# Patient Record
Sex: MALE | Race: WHITE | NOT HISPANIC OR LATINO | Employment: OTHER | URBAN - METROPOLITAN AREA
[De-identification: names, ages, dates, MRNs, and addresses within clinical notes are randomized per-mention and may not be internally consistent; named-entity substitution may affect disease eponyms.]

---

## 2017-11-10 ENCOUNTER — TRANSFERRED RECORDS (OUTPATIENT)
Dept: HEALTH INFORMATION MANAGEMENT | Facility: CLINIC | Age: 67
End: 2017-11-10

## 2018-02-09 ENCOUNTER — TELEPHONE (OUTPATIENT)
Dept: NEUROLOGY | Facility: CLINIC | Age: 68
End: 2018-02-09

## 2018-02-09 DIAGNOSIS — G24.3 CERVICAL DYSTONIA: Primary | ICD-10-CM

## 2018-02-09 NOTE — TELEPHONE ENCOUNTER
Called patient to discuss records needed. He will call [facility] and have the appropriate records sent.

## 2018-02-09 NOTE — TELEPHONE ENCOUNTER
----- Message from Jagdish Valentine MD sent at 2/8/2018 11:29 AM CST -----  Regarding: RE: Serge for Botox Injections  Contact: [phone]  His name is familiar but I can't remember what I inject him with or how much.    Could you call him and have him fax us his last [place] botox record.    Thanks,    Cb      ----- Message -----     From: Betsy Jackson RN     Sent: 2/5/2018  12:24 PM       To: Jagdish Valentine MD, Sadia Lynch, #  Subject: FW: Serge for Botox Injections               Order for botox will need to be written and note sent to Sadia Lynch.    Thank you,  Betsy  ----- Message -----     From: Jennifer Mcdowell     Sent: 2/5/2018  10:06 AM       To: Markel Dos Santos RN, Betsy Jackson RN  Subject: Serge for Botox Injections                   Call from PT as he was seeing Dr. Valentine at [facility] and is looking to schedule his next appt here.  Please call PT at [phone]    Thank You,  Jennifer    Please DO NOT send this message and/or reply back to sender.  Call Center Representatives DO NOT respond to messages.

## 2018-04-16 ASSESSMENT — ENCOUNTER SYMPTOMS
ARTHRALGIAS: 0
SHORTNESS OF BREATH: 1
DECREASED CONCENTRATION: 0
POSTURAL DYSPNEA: 0
COUGH: 1
BACK PAIN: 0
FATIGUE: 0
FLANK PAIN: 0
FEVER: 1
DYSPNEA ON EXERTION: 1
DIFFICULTY URINATING: 0
ALTERED TEMPERATURE REGULATION: 0
MUSCLE WEAKNESS: 0
NECK PAIN: 1
HEMOPTYSIS: 0
POLYPHAGIA: 0
INSOMNIA: 1
WEIGHT LOSS: 0
INCREASED ENERGY: 1
CHILLS: 1
MYALGIAS: 1
DYSURIA: 0
PANIC: 0
WEIGHT GAIN: 0
WHEEZING: 1
DECREASED APPETITE: 0
HEMATURIA: 0
HALLUCINATIONS: 0
MUSCLE CRAMPS: 0
DEPRESSION: 0
POLYDIPSIA: 0
SNORES LOUDLY: 0
SPUTUM PRODUCTION: 1
COUGH DISTURBING SLEEP: 1
NIGHT SWEATS: 1
NERVOUS/ANXIOUS: 0
JOINT SWELLING: 0
STIFFNESS: 0

## 2018-04-17 ENCOUNTER — OFFICE VISIT (OUTPATIENT)
Dept: NEUROLOGY | Facility: CLINIC | Age: 68
End: 2018-04-17
Payer: COMMERCIAL

## 2018-04-17 VITALS
HEART RATE: 84 BPM | SYSTOLIC BLOOD PRESSURE: 147 MMHG | BODY MASS INDEX: 24.27 KG/M2 | DIASTOLIC BLOOD PRESSURE: 90 MMHG | HEIGHT: 71 IN | WEIGHT: 173.4 LBS

## 2018-04-17 DIAGNOSIS — G24.3 CERVICAL DYSTONIA: Primary | ICD-10-CM

## 2018-04-17 RX ORDER — SIMVASTATIN 40 MG
40 TABLET ORAL AT BEDTIME
COMMUNITY

## 2018-04-17 RX ORDER — AMOXICILLIN 500 MG
CAPSULE ORAL
COMMUNITY

## 2018-04-17 RX ORDER — MULTIVITAMIN WITH IRON
TABLET ORAL
Status: ON HOLD | COMMUNITY
Start: 2018-01-01 | End: 2018-11-08

## 2018-04-17 RX ORDER — ALBUTEROL SULFATE 90 UG/1
AEROSOL, METERED RESPIRATORY (INHALATION) PRN
COMMUNITY

## 2018-04-17 ASSESSMENT — PAIN SCALES - GENERAL: PAINLEVEL: NO PAIN (0)

## 2018-04-17 NOTE — MR AVS SNAPSHOT
After Visit Summary   4/17/2018    Markel Baron    MRN: [id number]           Patient Information     Date Of Birth          1950        Visit Information        Provider Department      4/17/2018 9:00 AM Jagdish Valentine MD [facility] Neurology        Today's Diagnoses     Cervical dystonia    -  1      Care Instructions    #Call and make an appointment when you need your next injection.          Follow-ups after your visit        Follow-up notes from your care team     Return if symptoms worsen or fail to improve.      Future tests that were ordered for you today     Open Future Orders        Priority Expected Expires Ordered    Needle EMG Guide w/Chemodenervation (82490) Routine  4/17/2019 4/17/2018    HC CHEMODENERVATION MUSCLE NECK UNILAT Routine  4/17/2019 4/17/2018            Who to contact     Please call your clinic at [phone] to:    Ask questions about your health    Make or cancel appointments    Discuss your medicines    Learn about your test results    Speak to your doctor            Additional Information About Your Visit        [employer] Information     [employer] gives you secure access to your electronic health record. If you see a primary care provider, you can also send messages to your care team and make appointments. If you have questions, please call your primary care clinic.  If you do not have a primary care provider, please call [phone] and they will assist you.      [employer] is an electronic gateway that provides easy, online access to your medical records. With [employer], you can request a clinic appointment, read your test results, renew a prescription or communicate with your care team.     To access your existing account, please contact your [facility] or call [phone] for assistance.        Care EveryWhere ID     This is your Care EveryWhere ID. This could be used by other organizations to access your [facility]  "records  [id number]        Your Vitals Were     Pulse Height BMI (Body Mass Index)             84 1.803 m (5' 11\") 24.18 kg/m2          Blood Pressure from Last 3 Encounters:   04/17/18 147/90    Weight from Last 3 Encounters:   04/17/18 78.7 kg (173 lb 6.4 oz)               Primary Care Provider    None Specified       No primary provider on file.        Equal Access to Services     [facility] : Hadii aad ku hadasho Soomaali, waaxda luqadaha, qaybta kaalmada adeegyada, waxabe idiin jeanethn adetawanda ayala la'aan . So [facility] [phone].    ATENCIÓN: Si habla naomy, tiene a alegre disposición servicios gratuitos de asistencia lingüística. Llame al [phone].    We comply with applicable federal civil rights laws and [place] laws. We do not discriminate on the basis of race, color, national origin, age, disability, sex, sexual orientation, or gender identity.            Thank you!     Thank you for choosing [facility] NEUROLOGY  for your care. Our goal is always to provide you with excellent care. Hearing back from our patients is one way we can continue to improve our services. Please take a few minutes to complete the written survey that you may receive in the mail after your visit with us. Thank you!             Your Updated Medication List - Protect others around you: Learn how to safely use, store and throw away your medicines at www.disposemymeds.org.          This list is accurate as of 4/17/18  9:31 AM.  Always use your most recent med list.                   Brand Name Dispense Instructions for use Diagnosis    LASHAY ASPIRIN EC LOW DOSE 81 MG EC tablet   Generic drug:  aspirin           BOTOX 100 units injection   Generic drug:  botulinum toxin type A     2 each    Inject 800 units over 1 year.  Inject 200 units intramuscularly every 3 months    Cervical dystonia       DULERA 100-5 MCG/ACT oral inhaler   Generic drug:  mometasone-formoterol           fish Oil 1200 MG capsule           magnesium 250 MG " tablet           PROAIR  (90 Base) MCG/ACT Inhaler   Generic drug:  albuterol           simvastatin 40 MG tablet    ZOCOR          SUPER B-COMPLEX Tabs           vitamin D3 1000 units Caps

## 2018-04-17 NOTE — PROGRESS NOTES
Movement Disorders Botulinum Toxin Procedure Note    Chief Complaint: Cervical dystonia    History of Present Illness:  Markel Baron is a 67 year old male who presents to clinic for botulinum toxin injections for treatment of cervical dystonia    The patient reported an excellent response response to the last injection of November 2017 at the [facility].    Comments: Mr. Baron is coming to establish care here at the [facility].  I previously gave him botulinum toxin injections at the [facility]    Complications: None    Botulinum toxin effect: None    Prior to the start of the procedure and with procedural staff participation, I verbally confirmed the patient s identity using two indicators, relevant allergies, that the procedure was appropriate and matched the consent or emergent situation, and that the correct equipment/implants were available. Immediately prior to starting the procedure I conducted the Time Out with the procedural staff and re-confirmed the patient s name, procedure, and site/side. (The Joint Commission universal protocol was followed.)  Yes    TOTAL DOSE ADMINISTERED:  Dose Administered:  185 units Botox    Diluent Used:  Preservative Free Normal Saline  Total Volume of Diluent Used:  2 ml  Lot # [id number] with Expiration Date: October 2020      Medication guide was offered to patient and was accepted.    CONSENT:  The risks, benefits, and treatment options were discussed with Markel Baron and she agreed to proceed.      Written consent was obtained byYes    EQUIPMENT USED:  Needle-37mm stimulating/recording    SKIN PREPARATION:  Skin preparation was performed using an alcohol wipe.    GUIDANCE DESCRIPTION:  EMG guidance:Yes  Ultrasound guidance:No    AREA/MUSCLE INJECTED:          Muscles Injected Units Injected Number of Injections   Right sternocleidomastoid 45 1   Left splenius capitis  50 1   Left cervical paraspinals 45 1   Left levator scapulae 20 20 1   Left scalenes 25  1                       Total Units Injected: 185    Unavoidable Waste: 15    Total Units Billed 200      The patient tolerated the injections without difficulty.      Summary:    The patient was injected today with  185 units of onabotulinumtoxinA (Botox) under EMG guidance as treatment of cervical dystonia.  The patient tolerated the procedure well.  Plan  Follow-up in 4-5 months' time to consider repeat injections

## 2018-04-17 NOTE — LETTER
4/17/2018       RE: Markel Baron  [address] [address]     Dear Colleague,    Thank you for referring your patient, Markel Baron, to the [facility] NEUROLOGY at [facility]. Please see a copy of my visit note below.    Movement Disorders Botulinum Toxin Procedure Note    Chief Complaint: Cervical dystonia    History of Present Illness:  Markel Baron is a 67 year old male who presents to clinic for botulinum toxin injections for treatment of cervical dystonia    The patient reported an excellent response response to the last injection of November 2017 at the [facility].    Comments: Mr. Baron is coming to establish care here at the [facility].  I previously gave him botulinum toxin injections at the [facility]    Complications: None    Botulinum toxin effect: None    Prior to the start of the procedure and with procedural staff participation, I verbally confirmed the patient s identity using two indicators, relevant allergies, that the procedure was appropriate and matched the consent or emergent situation, and that the correct equipment/implants were available. Immediately prior to starting the procedure I conducted the Time Out with the procedural staff and re-confirmed the patient s name, procedure, and site/side. (The Joint Commission universal protocol was followed.)  Yes    TOTAL DOSE ADMINISTERED:  Dose Administered:  185 units Botox    Diluent Used:  Preservative Free Normal Saline  Total Volume of Diluent Used:  2 ml  Lot # [id number] with Expiration Date: October 2020      Medication guide was offered to patient and was accepted.    CONSENT:  The risks, benefits, and treatment options were discussed with Markel Baron and she agreed to proceed.      Written consent was obtained byYes    EQUIPMENT USED:  Needle-37mm stimulating/recording    SKIN PREPARATION:  Skin preparation was performed using an alcohol wipe.    GUIDANCE  DESCRIPTION:  EMG guidance:Yes  Ultrasound guidance:No    AREA/MUSCLE INJECTED:          Muscles Injected Units Injected Number of Injections   Right sternocleidomastoid 45 1   Left splenius capitis  50 1   Left cervical paraspinals 45 1   Left levator scapulae 20 20 1   Left scalenes 25 1                       Total Units Injected: 185    Unavoidable Waste: 15    Total Units Billed 200      The patient tolerated the injections without difficulty.      Summary:    The patient was injected today with  185 units of onabotulinumtoxinA (Botox) under EMG guidance as treatment of cervical dystonia.  The patient tolerated the procedure well.  Plan  Follow-up in 4-5 months' time to consider repeat injections      Again, thank you for allowing me to participate in the care of your patient.      Sincerely,    Jagdish Valentine MD

## 2018-04-18 ENCOUNTER — TRANSFERRED RECORDS (OUTPATIENT)
Dept: HEALTH INFORMATION MANAGEMENT | Facility: CLINIC | Age: 68
End: 2018-04-18

## 2018-04-26 ENCOUNTER — TRANSFERRED RECORDS (OUTPATIENT)
Dept: HEALTH INFORMATION MANAGEMENT | Facility: CLINIC | Age: 68
End: 2018-04-26

## 2018-05-08 ENCOUNTER — MYC MEDICAL ADVICE (OUTPATIENT)
Dept: NEUROLOGY | Facility: CLINIC | Age: 68
End: 2018-05-08

## 2018-05-11 NOTE — TELEPHONE ENCOUNTER
Hi Mr. Baron,    I looked at your MRI report.   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  This is compressing her spinal cord.  If this is causing symptoms she would have trouble with your gait or balance weakness in your legs or trouble controlling bladder control.  You might also have numbness in your legs.    I be happy to see her back about this and refer you to a neurosurgeon.  Perhaps you have already been referred to a neurosurgeon.  Certainly that is the next step.    Sincerely Jagdish Valentine MD

## 2018-07-27 DIAGNOSIS — D43.2 NEOPLASM OF UNCERTAIN BEHAVIOR OF BRAIN AND SPINAL CORD (H): Primary | ICD-10-CM

## 2018-07-27 DIAGNOSIS — D43.4 NEOPLASM OF UNCERTAIN BEHAVIOR OF BRAIN AND SPINAL CORD (H): Primary | ICD-10-CM

## 2018-08-30 ASSESSMENT — ENCOUNTER SYMPTOMS
STIFFNESS: 0
HEMATURIA: 0
MEMORY LOSS: 0
PALPITATIONS: 0
MUSCLE WEAKNESS: 0
PANIC: 0
INSOMNIA: 1
BACK PAIN: 1
DECREASED CONCENTRATION: 0
DYSURIA: 0
SPEECH CHANGE: 0
LOSS OF CONSCIOUSNESS: 0
LEG PAIN: 0
TREMORS: 0
COUGH DISTURBING SLEEP: 0
HYPERTENSION: 0
PARALYSIS: 0
DYSPNEA ON EXERTION: 0
SYNCOPE: 0
WHEEZING: 0
SHORTNESS OF BREATH: 1
HYPOTENSION: 0
NUMBNESS: 0
MUSCLE CRAMPS: 0
SPUTUM PRODUCTION: 0
HEMOPTYSIS: 0
SNORES LOUDLY: 0
POSTURAL DYSPNEA: 0
ORTHOPNEA: 0
DIFFICULTY URINATING: 1
WEAKNESS: 0
EXERCISE INTOLERANCE: 0
ARTHRALGIAS: 0
LIGHT-HEADEDNESS: 0
DEPRESSION: 0
SLEEP DISTURBANCES DUE TO BREATHING: 0
DISTURBANCES IN COORDINATION: 0
SEIZURES: 0
JOINT SWELLING: 0
NERVOUS/ANXIOUS: 1
TINGLING: 1
HEADACHES: 0
COUGH: 0
FLANK PAIN: 0
MYALGIAS: 1
NECK PAIN: 1
DIZZINESS: 1

## 2018-09-05 ENCOUNTER — PATIENT OUTREACH (OUTPATIENT)
Dept: CARE COORDINATION | Facility: CLINIC | Age: 68
End: 2018-09-05

## 2018-09-06 ENCOUNTER — RADIANT APPOINTMENT (OUTPATIENT)
Dept: GENERAL RADIOLOGY | Facility: CLINIC | Age: 68
End: 2018-09-06
Attending: NEUROLOGICAL SURGERY
Payer: COMMERCIAL

## 2018-09-06 ENCOUNTER — OFFICE VISIT (OUTPATIENT)
Dept: NEUROSURGERY | Facility: CLINIC | Age: 68
End: 2018-09-06
Payer: COMMERCIAL

## 2018-09-06 VITALS
OXYGEN SATURATION: 97 % | HEART RATE: 65 BPM | BODY MASS INDEX: 23.43 KG/M2 | DIASTOLIC BLOOD PRESSURE: 87 MMHG | SYSTOLIC BLOOD PRESSURE: 135 MMHG | RESPIRATION RATE: 18 BRPM | TEMPERATURE: 98.2 F | HEIGHT: 72 IN | WEIGHT: 173 LBS

## 2018-09-06 DIAGNOSIS — D43.2 NEOPLASM OF UNCERTAIN BEHAVIOR OF BRAIN AND SPINAL CORD (H): Primary | ICD-10-CM

## 2018-09-06 DIAGNOSIS — D43.4 NEOPLASM OF UNCERTAIN BEHAVIOR OF BRAIN AND SPINAL CORD (H): ICD-10-CM

## 2018-09-06 DIAGNOSIS — D43.2 NEOPLASM OF UNCERTAIN BEHAVIOR OF BRAIN AND SPINAL CORD (H): ICD-10-CM

## 2018-09-06 DIAGNOSIS — D43.4 NEOPLASM OF UNCERTAIN BEHAVIOR OF BRAIN AND SPINAL CORD (H): Primary | ICD-10-CM

## 2018-09-06 DIAGNOSIS — G24.3 CERVICAL DYSTONIA: ICD-10-CM

## 2018-09-06 PROBLEM — D17.79 LIPOMA OF SPINAL CORD: Status: ACTIVE | Noted: 2018-04-26

## 2018-09-06 ASSESSMENT — PAIN SCALES - GENERAL: PAINLEVEL: MILD PAIN (2)

## 2018-09-06 NOTE — LETTER
"9/6/2018       RE: Markel Baron  [address] [address]     Dear Colleague,    Thank you for referring your patient, Markel Baron, to the [facility] NEUROSURGERY at [facility]. Please see a copy of my visit note below.        Neurosurgery Clinic Note    Chief Complaint: \"I have a tumor in my spinal cord\"    History of Present Illness:  It was a pleasure to evaluate Markel Baron in clinic today at the kind referral of Jagdish Valentine MD  [address] [id number].    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    He was diagnosed with cervical dystonia two years ago. He sees [facility] for Botox injections. He has a left head-turn, never right. Sensory distraction with hand on chin/face seems to help decrease the dystonia.    Patient has no pain related to thoracic spine lesion, no radiating or radicular pain in arms/hands/chest. Has numbness in left great toe. Is not sure whether has bladder urgency changes, he will urinate 2-3 times per night and every several hours during the day. He feels like his balance may be changing. He denies any weakness.    He had one episode of having loose stool incontinence that he did not realize, two months ago. This was an isolated incident.          Review of Systems   Answers for HPI/ROS submitted by the patient on 8/30/2018   General Symptoms: No  Skin Symptoms: No  HENT Symptoms: No  EYE SYMPTOMS: No  HEART SYMPTOMS: Yes  LUNG SYMPTOMS: Yes  INTESTINAL SYMPTOMS: No  URINARY SYMPTOMS: Yes  REPRODUCTIVE SYMPTOMS: No  SKELETAL SYMPTOMS: Yes  BLOOD SYMPTOMS: No  NERVOUS SYSTEM SYMPTOMS: Yes  MENTAL HEALTH SYMPTOMS: Yes  Cough: No  Sputum or phlegm: No  Coughing up blood: No  Difficulty breating or shortness of breath: " Yes  Snoring: No  Wheezing: No  Difficulty breathing on exertion: No  Nighttime Cough: No  Difficulty breathing when lying flat: No  Chest pain or pressure: No  Fast or irregular heartbeat: No  Pain in legs with walking: No  Trouble breathing while lying down: No  Fingers or toes appear blue: No  High blood pressure: No  Low blood pressure: No  Fainting: No  Murmurs: No  Pacemaker: No  Varicose veins: No  Edema or swelling: Yes  Wake up at night with shortness of breath: No  Light-headedness: No  Exercise intolerance: No  Trouble holding urine or incontinence: No  Pain or burning: No  Trouble starting or stopping: No  Increased frequency of urination: Yes  Blood in urine: No  Decreased frequency of urination: No  Frequent nighttime urination: Yes  Flank pain: No  Difficulty emptying bladder: Yes  Back pain: Yes  Muscle aches: Yes  Neck pain: Yes  Swollen joints: No  Joint pain: No  Bone pain: No  Muscle cramps: No  Muscle weakness: No  Joint stiffness: No  Bone fracture: No  Trouble with coordination: No  Dizziness or trouble with balance: Yes  Fainting or black-out spells: No  Memory loss: No  Headache: No  Seizures: No  Speech problems: No  Tingling: Yes  Tremor: No  Weakness: No  Difficulty walking: No  Paralysis: No  Numbness: No  Nervous or Anxious: Yes  Depression: No  Trouble sleeping: Yes  Trouble thinking or concentrating: No  Mood changes: No  Panic attacks: No  PHQ-2 Score: 2    Past Medical History- cervical dystonia    Past Surgical History:   Procedure Laterality Date     COLONOSCOPY  5 years ago     HERNIA REPAIR  17 years ago     ORTHOPEDIC SURGERY  shoulder 37 years ago, knee cartilage 30 years ago       Social History     Social History     Marital status:      Spouse name: N/A     Number of children: N/A     Years of education: N/A     Social History Main Topics     Smoking status: Former Smoker     Packs/day: 1.50     Years: 10.00     Types: Cigarettes, Dip, chew, snus or snuff     Quit  "date: 1985     Smokeless tobacco: Former User     Quit date: 1985     Alcohol use No     Drug use: No     Sexual activity: No     Other Topics Concern     Parent/Sibling W/ Cabg, Mi Or Angioplasty Before 65f 55m? Yes     mother was 65 when she  of heart attack     Social History Narrative     Family History- no known dystonia      IMAGING per my own measurement and interpretation:  Xrays:standing long cassette 18    No scoliosis, normal sagittal alignment      MRI thoracic spine with contrast  at outside hospital:  T1 and T2 hyperintense intradural mass lesion upper thoracic spine, non-enhancing      Resulted Imaging/Labs:  Bone Density:  No results found.    Vitamin D:  Vitamin D Deficiency Screening Results:  No results found for: VITDT  No results found for: FWR834, UGOV519, RGBJ23NVZYV, VITD3, D2VIT, D3VIT, DTOT, [id number], [id number], [id number], [id number], [id number], [id number]      Nutritional Status:  Estimated body mass index is 23.79 kg/(m^2) as calculated from the following:    Height as of this encounter: 1.816 m (5' 11.5\").    Weight as of this encounter: 78.5 kg (173 lb).    No results found for: ALBUMIN    Diabetes Screening:  No results found for: A1C    Nicotine Usage:    No but former                Physical Exam   /87  Pulse 65  Temp 98.2  F (36.8  C) (Oral)  Resp 18  Ht 1.816 m (5' 11.5\")  Wt 78.5 kg (173 lb)  SpO2 97%  BMI 23.79 kg/m2  Constitutional: Oriented to person, place, and time. Appears well-developed and well-nourished. Cooperative. No distress.   HENT:   Head: Normocephalic and atraumatic.   Eyes: Conjunctivae are normal.  Neck: No spinous process tenderness and no muscular tenderness present. No tracheal deviation present.  Cardiovascular: Normal rate and regular rhythm.    Pulmonary/Chest: Effort normal and breath sounds normal.  Abdominal: Soft. Bowel sounds are normal. Exhibits no distension. There is no tenderness.   Musculoskeletal: "   Cervical flexion-extension range of motion: leftward chin tilt and left torticollis  Lumbar flexion/extension range of motion: normal    Neurological: alert and oriented to person, place, and time.   No cranial nerve deficit   sensory deficit left great toe  Gait normal  Babinski downgoing toes    Reflex Scores:        Tricep reflexes are 2+ on the right side and 2+ on the left side.       Bicep reflexes are 2+ on the right side and 2+ on the left side.       Brachioradialis reflexes are 2+ on the right side and 2+ on the left side.       Patellar reflexes are 2+ on the right side and 2+ on the left side.       Achilles reflexes are 2+ on the right side and 2+ on the left side.    STRENGTH LEFT RIGHT   Deltoid 5 5   Bicep 5 5   Wrist Extensor 5 5   Tricep 5 5   Finger flexion 5 5   Finger abduction 5 5    5 5       Hip Flexion     5     5   Knee Extension 5 5   Ankle Dorsiflexion 5 5   Extensor Hallucis Longus 5 5   Plantar Flexion 5 5   Foot eversion 5 5   Foot inversion 5 5     No Lhermitte's, No Spurling's  No Danica's   No ankle clonus  Able to tandem walk with mild difficulty    Skin: Skin is warm, dry and intact.   Psychiatric: Normal mood and affect. Speech is normal and behavior is normal.    ASSESSMENT:  Markel Baron is a 67 year old male with intradural thoracic mass lesion, and cervical dystonia    PLAN:  We will obtain an MRI brain/cervical/thoracic/lumbar spine to evaluate any additional mass lesions.  We will get the imaging from CT scan of chest to evaluate for the potential calcification that radiologist mentions seeing in thoracic area.    We will obtain bladder ultrasound to evaluate for residual post-void urine volume to see if there is significant bladder emptying/spasticity.    Patient to return with above completed.    Isadora Boswell MD    [facility]  Complex Spinal Deformity, Scoliosis, and Minimally Invasive Spine Surgery  Specialist  Office: [phone]    9/6/2018    I spent 60  minutes in patient care with greater than 50% spent in counseling and/or coordination of care.

## 2018-09-06 NOTE — PATIENT INSTRUCTIONS
Have a CT done of chest, abdomen and pelivs  MRIs done of the cervical, thoracic and lumbar spine.  An ultra sound to evaluate bladder emptying.    These tests can be done in the morning and see Dr. Boswell in the afternoon.

## 2018-09-06 NOTE — MR AVS SNAPSHOT
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  An ultra sound to evaluate bladder emptying.    These tests can be done in the morning and see Dr. Boswell in the afternoon.          Follow-ups after your visit        Follow-up notes from your care team     Return if symptoms worsen or fail to improve.      Your next 10 appointments already scheduled     Sep 26, 2018  3:00 PM CDT   US BLADDER ONLY with UCUS3   [facility] US ([facility])    [address] [place] [id number] [phone]           Please bring a list of your medicines (including vitamins, minerals and over-the-counter drugs). Also, tell your doctor about any allergies you may have. Wear comfortable clothes and leave your valuables at home.  Adults: Drink two 8-ounce glasses of fluid 45 minutes before your exam. Do not empty your bladder until after this test.  Children: Children who are potty trained up to 6 years old should drink at least 2 cups (16 oz) of water/non-carbonated beverage 30 minutes prior to the exam. Children who are 6-10 years should drink at least 3 cups (24 oz) of water/non-carbonated beverage 45 minutes prior to the exam. Children who are 10 years or older should drink at least 4 cups (32 oz) of water/non-carbonated beverage 45 minutes prior to the exam. If your child is very uncomfortable or has an urgent need to pee, please notify a technologist; they will try to find out how much longer the wait may  be and provide instructions to help relieve the pressure.  Please call the Imaging Department at your exam site with any questions.            Sep 26, 2018  4:00 PM CDT   MR BRAIN W/O & W CONTRAST with [address] [facility] MRI ([facility] [place])    [address] [id number] [phone]           Take your medicines as usual, unless your doctor tells you not to. Bring a list of your current medicines to your exam (including vitamins, minerals and over-the-counter drugs).  You may or may not receive intravenous (IV) contrast for this exam pending the discretion of the Radiologist.  You do not need to do anything special to prepare.  The MRI machine uses a strong magnet. Please wear clothes without metal (snaps, zippers). A sweatsuit works well, or we may give you a hospital gown.  Please remove any body piercings and hair extensions before you arrive. You will also remove watches, jewelry, hairpins, wallets, dentures, partial dental plates and hearing aids. You may wear contact lenses, and you may be able to wear your rings. We have a safe place to keep your personal items, but it is safer to leave them at home.  **IMPORTANT** THE INSTRUCTIONS BELOW ARE ONLY FOR THOSE PATIENTS WHO HAVE BEEN PRESCRIBED SEDATION OR GENERAL ANESTHESIA DURING THEIR MRI PROCEDURE:  IF YOUR DOCTOR PRESCRIBED ORAL SEDATION (take medicine to help you relax during your exam):   You must get the medicine from your doctor (oral medication) before you arrive. Bring the medicine to the exam. Do not take it at home. You ll be told when to take it upon arriving for your exam.   Arrive one hour early. Bring someone who can take you home after the test. Your medicine will make you sleepy. After the exam, you may not drive, take a bus or take a taxi by yourself.  IF YOUR DOCTOR PRESCRIBED IV SEDATION:   Arrive one hour early. Bring someone who can take you home after the test. Your  medicine will make you sleepy. After the exam, you may not drive, take a bus or take a taxi by yourself.   No eating 6 hours before your exam. You may have clear liquids up until 4 hours before your exam. (Clear liquids include water, clear tea, black coffee and fruit juice without pulp.)  IF YOUR DOCTOR PRESCRIBED ANESTHESIA (be asleep for your exam):   Arrive 1 1/2 hours early. Bring someone who can take you home after the test. You may not drive, take a bus or take a taxi by yourself.   No eating 8 hours before your exam. You may have clear liquids up until 4 hours before your exam. (Clear liquids include water, clear tea, black coffee and fruit juice without pulp.)   You will spend four to five hours in the recovery room.  Please call the Imaging Department at your exam site with any questions.            Sep 26, 2018  4:45 PM CDT   MR CERVICAL SPINE W/O & W CONTRAST with [address] MRI ([facility] [place])    [address] [address] [phone]           Take your medicines as usual, unless your doctor tells you not to. Bring a list of your current medicines to your exam (including vitamins, minerals and over-the-counter drugs).  You may or may not receive intravenous (IV) contrast for this exam pending the discretion of the Radiologist.  You do not need to do anything special to prepare.  The MRI machine uses a strong magnet. Please wear clothes without metal (snaps, zippers). A sweatsuit works well, or we may give you a hospital gown.  Please remove any body piercings and hair extensions before you arrive. You will also remove watches, jewelry, hairpins, wallets, dentures, partial dental plates and hearing aids. You may wear contact lenses, and you may be able to wear your rings. We have a safe place to keep your personal items, but it is safer to leave them at home.  **IMPORTANT** THE INSTRUCTIONS BELOW ARE ONLY FOR THOSE PATIENTS  WHO HAVE BEEN PRESCRIBED SEDATION OR GENERAL ANESTHESIA DURING THEIR MRI PROCEDURE:  IF YOUR DOCTOR PRESCRIBED ORAL SEDATION (take medicine to help you relax during your exam):   You must get the medicine from your doctor (oral medication) before you arrive. Bring the medicine to the exam. Do not take it at home. You ll be told when to take it upon arriving for your exam.   Arrive one hour early. Bring someone who can take you home after the test. Your medicine will make you sleepy. After the exam, you may not drive, take a bus or take a taxi by yourself.  IF YOUR DOCTOR PRESCRIBED IV SEDATION:   Arrive one hour early. Bring someone who can take you home after the test. Your medicine will make you sleepy. After the exam, you may not drive, take a bus or take a taxi by yourself.   No eating 6 hours before your exam. You may have clear liquids up until 4 hours before your exam. (Clear liquids include water, clear tea, black coffee and fruit juice without pulp.)  IF YOUR DOCTOR PRESCRIBED ANESTHESIA (be asleep for your exam):   Arrive 1 1/2 hours early. Bring someone who can take you home after the test. You may not drive, take a bus or take a taxi by yourself.   No eating 8 hours before your exam. You may have clear liquids up until 4 hours before your exam. (Clear liquids include water, clear tea, black coffee and fruit juice without pulp.)   You will spend four to five hours in the recovery room.  Please call the Imaging Department at your exam site with any questions.            Sep 26, 2018  5:30 PM CDT   MR THORACIC SPINE W/O & W CONTRAST with [address] [facility] ([facility])    [address] [address] [phone]           Take your medicines as usual, unless your doctor tells you not to. Bring a list of your current medicines to your exam (including vitamins, minerals and over-the-counter drugs).  You may or may not receive  intravenous (IV) contrast for this exam pending the discretion of the Radiologist.  You do not need to do anything special to prepare.  The MRI machine uses a strong magnet. Please wear clothes without metal (snaps, zippers). A sweatsuit works well, or we may give you a hospital gown.  Please remove any body piercings and hair extensions before you arrive. You will also remove watches, jewelry, hairpins, wallets, dentures, partial dental plates and hearing aids. You may wear contact lenses, and you may be able to wear your rings. We have a safe place to keep your personal items, but it is safer to leave them at home.  **IMPORTANT** THE INSTRUCTIONS BELOW ARE ONLY FOR THOSE PATIENTS WHO HAVE BEEN PRESCRIBED SEDATION OR GENERAL ANESTHESIA DURING THEIR MRI PROCEDURE:  IF YOUR DOCTOR PRESCRIBED ORAL SEDATION (take medicine to help you relax during your exam):   You must get the medicine from your doctor (oral medication) before you arrive. Bring the medicine to the exam. Do not take it at home. You ll be told when to take it upon arriving for your exam.   Arrive one hour early. Bring someone who can take you home after the test. Your medicine will make you sleepy. After the exam, you may not drive, take a bus or take a taxi by yourself.  IF YOUR DOCTOR PRESCRIBED IV SEDATION:   Arrive one hour early. Bring someone who can take you home after the test. Your medicine will make you sleepy. After the exam, you may not drive, take a bus or take a taxi by yourself.   No eating 6 hours before your exam. You may have clear liquids up until 4 hours before your exam. (Clear liquids include water, clear tea, black coffee and fruit juice without pulp.)  IF YOUR DOCTOR PRESCRIBED ANESTHESIA (be asleep for your exam):   Arrive 1 1/2 hours early. Bring someone who can take you home after the test. You may not drive, take a bus or take a taxi by yourself.   No eating 8 hours before your exam. You may have clear liquids up until 4 hours  before your exam. (Clear liquids include water, clear tea, black coffee and fruit juice without pulp.)   You will spend four to five hours in the recovery room.  Please call the Imaging Department at your exam site with any questions.            Sep 26, 2018  6:15 PM CDT   MR LUMBAR SPINE W/O & W CONTRAST with [address] MRI ([facility] [place])    [address] [address] [phone]           Take your medicines as usual, unless your doctor tells you not to. Bring a list of your current medicines to your exam (including vitamins, minerals and over-the-counter drugs).  You may or may not receive intravenous (IV) contrast for this exam pending the discretion of the Radiologist.  You do not need to do anything special to prepare.  The MRI machine uses a strong magnet. Please wear clothes without metal (snaps, zippers). A sweatsuit works well, or we may give you a hospital gown.  Please remove any body piercings and hair extensions before you arrive. You will also remove watches, jewelry, hairpins, wallets, dentures, partial dental plates and hearing aids. You may wear contact lenses, and you may be able to wear your rings. We have a safe place to keep your personal items, but it is safer to leave them at home.  **IMPORTANT** THE INSTRUCTIONS BELOW ARE ONLY FOR THOSE PATIENTS WHO HAVE BEEN PRESCRIBED SEDATION OR GENERAL ANESTHESIA DURING THEIR MRI PROCEDURE:  IF YOUR DOCTOR PRESCRIBED ORAL SEDATION (take medicine to help you relax during your exam):   You must get the medicine from your doctor (oral medication) before you arrive. Bring the medicine to the exam. Do not take it at home. You ll be told when to take it upon arriving for your exam.   Arrive one hour early. Bring someone who can take you home after the test. Your medicine will make you sleepy. After the exam, you may not drive, take a bus or take a taxi by yourself.  IF YOUR DOCTOR  PRESCRIBED IV SEDATION:   Arrive one hour early. Bring someone who can take you home after the test. Your medicine will make you sleepy. After the exam, you may not drive, take a bus or take a taxi by yourself.   No eating 6 hours before your exam. You may have clear liquids up until 4 hours before your exam. (Clear liquids include water, clear tea, black coffee and fruit juice without pulp.)  IF YOUR DOCTOR PRESCRIBED ANESTHESIA (be asleep for your exam):   Arrive 1 1/2 hours early. Bring someone who can take you home after the test. You may not drive, take a bus or take a taxi by yourself.   No eating 8 hours before your exam. You may have clear liquids up until 4 hours before your exam. (Clear liquids include water, clear tea, black coffee and fruit juice without pulp.)   You will spend four to five hours in the recovery room.  Please call the Imaging Department at your exam site with any questions.            Sep 27, 2018 10:15 AM CDT   (Arrive by 10:00 AM)   Return Visit with Isadora Boswell MD   [facility] Neurosurgery ([facility])    [address] [place] [id number] [phone]              Future tests that were ordered for you today     Open Future Orders        Priority Expected Expires Ordered    MRI Cervical spine with & without gadolinium [ION172] Routine  9/6/2019 9/6/2018    MRI Thoracic spine with & without gadolinium [WDC941] Routine  9/6/2019 9/6/2018    MRI Lumbar spine with & without gadolinium [QED083] Routine  9/6/2019 9/6/2018    US Bladder Only Routine  9/6/2019 9/6/2018    MRI Brain with & without gadolinium [VIF187] Routine  9/6/2019 9/6/2018            Who to contact     Please call your clinic at [phone] to:    Ask questions about your health    Make or cancel appointments    Discuss your medicines    Learn about your test results    Speak to your doctor            Additional Information About Your Visit        [facility]  "Information     [employer] gives you secure access to your electronic health record. If you see a primary care provider, you can also send messages to your care team and make appointments. If you have questions, please call your primary care clinic.  If you do not have a primary care provider, please call [phone] and they will assist you.      [employer] is an electronic gateway that provides easy, online access to your medical records. With [employer], you can request a clinic appointment, read your test results, renew a prescription or communicate with your care team.     To access your existing account, please contact your [facility] or call [phone] for assistance.        Care EveryWhere ID     This is your Care EveryWhere ID. This could be used by other organizations to access your [place] medical records  [id number]        Your Vitals Were     Pulse Temperature Respirations Height Pulse Oximetry BMI (Body Mass Index)    65 98.2  F (36.8  C) (Oral) 18 1.816 m (5' 11.5\") 97% 23.79 kg/m2       Blood Pressure from Last 3 Encounters:   09/06/18 135/87   04/17/18 147/90    Weight from Last 3 Encounters:   09/06/18 78.5 kg (173 lb)   04/17/18 78.7 kg (173 lb 6.4 oz)               Primary Care Provider    None Specified       No primary provider on file.        Equal Access to Services     NATHALIA NEVAREZ : Hadii lucita britto Somayur, waaxda luqadaha, qaybta kaalmada adeegyada, catalina sotelo . So [facility] [phone].    ATENCIÓN: Si habla español, tiene a alegre disposición servicios gratuitos de asistencia lingüística. Llame al [phone].    We comply with applicable federal civil rights laws and [place] laws. We do not discriminate on the basis of race, color, national origin, age, disability, sex, sexual orientation, or gender identity.            Thank you!     Thank you for choosing [facility]  for your care. Our goal is always to provide you " with excellent care. Hearing back from our patients is one way we can continue to improve our services. Please take a few minutes to complete the written survey that you may receive in the mail after your visit with us. Thank you!             Your Updated Medication List - Protect others around you: Learn how to safely use, store and throw away your medicines at www.disposemymeds.org.          This list is accurate as of 9/6/18 11:52 AM.  Always use your most recent med list.                   Brand Name Dispense Instructions for use Diagnosis    LASHAY ASPIRIN EC LOW DOSE 81 MG EC tablet   Generic drug:  aspirin           BOTOX 100 units injection   Generic drug:  botulinum toxin type A     2 each    Inject 800 units over 1 year.  Inject 200 units intramuscularly every 3 months    Cervical dystonia       DULERA 100-5 MCG/ACT oral inhaler   Generic drug:  mometasone-formoterol           fish Oil 1200 MG capsule           magnesium 250 MG tablet           PROAIR  (90 Base) MCG/ACT inhaler   Generic drug:  albuterol           simvastatin 40 MG tablet    ZOCOR          SUPER B-COMPLEX Tabs           vitamin D3 1000 units Caps

## 2018-09-06 NOTE — PROGRESS NOTES
"    Neurosurgery Clinic Note    Chief Complaint: \"I have a tumor in my spinal cord\"    History of Present Illness:  It was a pleasure to evaluate Markel Baron in clinic today at the kind referral of Jagdish Valentine MD  [address] [place], [place] [id number].    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    He was diagnosed with cervical dystonia two years ago. He sees [facility] for Botox injections. He has a left head-turn, never right. Sensory distraction with hand on chin/face seems to help decrease the dystonia.    Patient has no pain related to thoracic spine lesion, no radiating or radicular pain in arms/hands/chest. Has numbness in left great toe. Is not sure whether has bladder urgency changes, he will urinate 2-3 times per night and every several hours during the day. He feels like his balance may be changing. He denies any weakness.    He had one episode of having loose stool incontinence that he did not realize, two months ago. This was an isolated incident.          Review of Systems   Answers for HPI/ROS submitted by the patient on 8/30/2018   General Symptoms: No  Skin Symptoms: No  HENT Symptoms: No  EYE SYMPTOMS: No  HEART SYMPTOMS: Yes  LUNG SYMPTOMS: Yes  INTESTINAL SYMPTOMS: No  URINARY SYMPTOMS: Yes  REPRODUCTIVE SYMPTOMS: No  SKELETAL SYMPTOMS: Yes  BLOOD SYMPTOMS: No  NERVOUS SYSTEM SYMPTOMS: Yes  MENTAL HEALTH SYMPTOMS: Yes  Cough: No  Sputum or phlegm: No  Coughing up blood: No  Difficulty breating or shortness of breath: Yes  Snoring: No  Wheezing: No  Difficulty breathing on exertion: No  Nighttime Cough: No  Difficulty breathing when lying flat: No  Chest pain or pressure: No  Fast or irregular heartbeat: No  Pain in legs with walking: No  Trouble breathing while lying down: No  Fingers or toes appear " blue: No  High blood pressure: No  Low blood pressure: No  Fainting: No  Murmurs: No  Pacemaker: No  Varicose veins: No  Edema or swelling: Yes  Wake up at night with shortness of breath: No  Light-headedness: No  Exercise intolerance: No  Trouble holding urine or incontinence: No  Pain or burning: No  Trouble starting or stopping: No  Increased frequency of urination: Yes  Blood in urine: No  Decreased frequency of urination: No  Frequent nighttime urination: Yes  Flank pain: No  Difficulty emptying bladder: Yes  Back pain: Yes  Muscle aches: Yes  Neck pain: Yes  Swollen joints: No  Joint pain: No  Bone pain: No  Muscle cramps: No  Muscle weakness: No  Joint stiffness: No  Bone fracture: No  Trouble with coordination: No  Dizziness or trouble with balance: Yes  Fainting or black-out spells: No  Memory loss: No  Headache: No  Seizures: No  Speech problems: No  Tingling: Yes  Tremor: No  Weakness: No  Difficulty walking: No  Paralysis: No  Numbness: No  Nervous or Anxious: Yes  Depression: No  Trouble sleeping: Yes  Trouble thinking or concentrating: No  Mood changes: No  Panic attacks: No  PHQ-2 Score: 2    Past Medical History- cervical dystonia    Past Surgical History:   Procedure Laterality Date     COLONOSCOPY  5 years ago     HERNIA REPAIR  17 years ago     ORTHOPEDIC SURGERY  shoulder 37 years ago, knee cartilage 30 years ago       Social History     Social History     Marital status:      Spouse name: N/A     Number of children: N/A     Years of education: N/A     Social History Main Topics     Smoking status: Former Smoker     Packs/day: 1.50     Years: 10.00     Types: Cigarettes, Dip, chew, snus or snuff     Quit date: 1985     Smokeless tobacco: Former User     Quit date: 1985     Alcohol use No     Drug use: No     Sexual activity: No     Other Topics Concern     Parent/Sibling W/ Cabg, Mi Or Angioplasty Before 65f 55m? Yes     mother was 65 when she  of heart attack     Social  "History Narrative     Family History- no known dystonia      IMAGING per my own measurement and interpretation:  Xrays:standing long cassette 09/06/18    No scoliosis, normal sagittal alignment      MRI thoracic spine with contrast 5/18 at outside hospital:  T1 and T2 hyperintense intradural mass lesion upper thoracic spine, non-enhancing      Resulted Imaging/Labs:  Bone Density:  No results found.    Vitamin D:  Vitamin D Deficiency Screening Results:  No results found for: VITDT  No results found for: SYT141, ABUT282, [id number], VITD3, D2VIT, D3VIT, DTOT, [id number], [id number], [id number], [id number], [id number], [id number]      Nutritional Status:  Estimated body mass index is 23.79 kg/(m^2) as calculated from the following:    Height as of this encounter: 1.816 m (5' 11.5\").    Weight as of this encounter: 78.5 kg (173 lb).    No results found for: ALBUMIN    Diabetes Screening:  No results found for: A1C    Nicotine Usage:    No but former                Physical Exam   /87  Pulse 65  Temp 98.2  F (36.8  C) (Oral)  Resp 18  Ht 1.816 m (5' 11.5\")  Wt 78.5 kg (173 lb)  SpO2 97%  BMI 23.79 kg/m2  Constitutional: Oriented to person, place, and time. Appears well-developed and well-nourished. Cooperative. No distress.   HENT:   Head: Normocephalic and atraumatic.   Eyes: Conjunctivae are normal.  Neck: No spinous process tenderness and no muscular tenderness present. No tracheal deviation present.  Cardiovascular: Normal rate and regular rhythm.    Pulmonary/Chest: Effort normal and breath sounds normal.  Abdominal: Soft. Bowel sounds are normal. Exhibits no distension. There is no tenderness.   Musculoskeletal:   Cervical flexion-extension range of motion: leftward chin tilt and left torticollis  Lumbar flexion/extension range of motion: normal    Neurological: alert and oriented to person, place, and time.   No cranial nerve deficit   sensory deficit left great toe  Gait normal  Babinski downgoing " toes    Reflex Scores:        Tricep reflexes are 2+ on the right side and 2+ on the left side.       Bicep reflexes are 2+ on the right side and 2+ on the left side.       Brachioradialis reflexes are 2+ on the right side and 2+ on the left side.       Patellar reflexes are 2+ on the right side and 2+ on the left side.       Achilles reflexes are 2+ on the right side and 2+ on the left side.    STRENGTH LEFT RIGHT   Deltoid 5 5   Bicep 5 5   Wrist Extensor 5 5   Tricep 5 5   Finger flexion 5 5   Finger abduction 5 5    5 5       Hip Flexion     5     5   Knee Extension 5 5   Ankle Dorsiflexion 5 5   Extensor Hallucis Longus 5 5   Plantar Flexion 5 5   Foot eversion 5 5   Foot inversion 5 5     No Lhermitte's, No Spurling's  No Danica's   No ankle clonus  Able to tandem walk with mild difficulty      Skin: Skin is warm, dry and intact.   Psychiatric: Normal mood and affect. Speech is normal and behavior is normal.        ASSESSMENT:  Markel Baron is a 67 year old male with intradural thoracic mass lesion, and cervical dystonia    PLAN:    We will obtain an MRI brain/cervical/thoracic/lumbar spine to evaluate any additional mass lesions.  We will get the imaging from CT scan of chest to evaluate for the potential calcification that radiologist mentions seeing in thoracic area.    We will obtain bladder ultrasound to evaluate for residual post-void urine volume to see if there is significant bladder emptying/spasticity.    Patient to return with above completed.          Isadora Boswell MD    [facility] Department of Neurosurgery  Complex Spinal Deformity, Scoliosis, and Minimally Invasive Spine Surgery Specialist  Office: [phone]    9/6/2018    I spent 60  minutes in patient care with greater than 50% spent in counseling and/or coordination of care.

## 2018-09-26 ENCOUNTER — RADIANT APPOINTMENT (OUTPATIENT)
Dept: MRI IMAGING | Facility: CLINIC | Age: 68
End: 2018-09-26
Attending: NEUROLOGICAL SURGERY
Payer: COMMERCIAL

## 2018-09-26 ENCOUNTER — RADIANT APPOINTMENT (OUTPATIENT)
Dept: ULTRASOUND IMAGING | Facility: CLINIC | Age: 68
End: 2018-09-26
Attending: NEUROLOGICAL SURGERY
Payer: COMMERCIAL

## 2018-09-26 DIAGNOSIS — G24.3 CERVICAL DYSTONIA: ICD-10-CM

## 2018-09-26 RX ORDER — GADOBUTROL 604.72 MG/ML
10 INJECTION INTRAVENOUS ONCE
Status: ACTIVE | OUTPATIENT
Start: 2018-09-26

## 2018-09-26 RX ORDER — GADOBUTROL 604.72 MG/ML
7.5 INJECTION INTRAVENOUS ONCE
Status: COMPLETED | OUTPATIENT
Start: 2018-09-26 | End: 2018-09-26

## 2018-09-26 RX ADMIN — GADOBUTROL 7.5 ML: 604.72 INJECTION INTRAVENOUS at 16:24

## 2018-09-26 NOTE — DISCHARGE INSTRUCTIONS
MRI Contrast Discharge Instructions    The IV contrast you received today will pass out of your body in your  urine. This will happen in the next 24 hours. You will not feel this process.  Your urine will not change color.    Drink at least 4 extra glasses of water or juice today (unless your doctor  has restricted your fluids). This reduces the stress on your kidneys.  You may take your regular medicines.    If you are on dialysis: It is best to have dialysis today.    If you have a reaction: Most reactions happen right away. If you have  any new symptoms after leaving the hospital (such as hives or swelling),  call your hospital at the correct number below. Or call your family doctor.  If you have breathing distress or wheezing, call 911.    Special instructions: ***    I have read and understand the above information.    Signature:______________________________________ Date:___________    Staff:__________________________________________ Date:___________     Time:__________    [place] [facility] Departments:    ___[place]: [phone]  ___[facility]: [phone]  ___[place]: [phone] ___[facility]: [phone]  ___[facility]: [phone]  ___[facility]: [phone]  ___[phone]  ___[facility]: [phone]  ___[phone]

## 2018-09-26 NOTE — DISCHARGE INSTRUCTIONS
MRI Contrast Discharge Instructions    The IV contrast you received today will pass out of your body in your  urine. This will happen in the next 24 hours. You will not feel this process.  Your urine will not change color.    Drink at least 4 extra glasses of water or juice today (unless your doctor  has restricted your fluids). This reduces the stress on your kidneys.  You may take your regular medicines.    If you are on dialysis: It is best to have dialysis today.    If you have a reaction: Most reactions happen right away. If you have  any new symptoms after leaving the hospital (such as hives or swelling),  call your hospital at the correct number below. Or call your family doctor.  If you have breathing distress or wheezing, call 911.    Special instructions: ***    I have read and understand the above information.    Signature:______________________________________ Date:___________    Staff:__________________________________________ Date:___________     Time:__________    [place] [facility] Departments:    ___[place]: [phone]  ___[facility]: [phone]  ___[place]: [phone] ___[facility]: [phone]  ___[facility]: [phone]  ___[facility]: [phone]  ___[phone]  ___[facility]: [phone]  ___[phone]

## 2018-09-26 NOTE — DISCHARGE INSTRUCTIONS
MRI Contrast Discharge Instructions    The IV contrast you received today will pass out of your body in your  urine. This will happen in the next 24 hours. You will not feel this process.  Your urine will not change color.    Drink at least 4 extra glasses of water or juice today (unless your doctor  has restricted your fluids). This reduces the stress on your kidneys.  You may take your regular medicines.    If you are on dialysis: It is best to have dialysis today.    If you have a reaction: Most reactions happen right away. If you have  any new symptoms after leaving the hospital (such as hives or swelling),  call your hospital at the correct number below. Or call your family doctor.  If you have breathing distress or wheezing, call 911.    Special instructions: ***    I have read and understand the above information.    Signature:______________________________________ Date:___________    Staff:__________________________________________ Date:___________     Time:__________    [place] [facility] Departments:    ___[place]: [phone]  ___[facility]: [phone]  ___[place]: [phone] ___[facility]: [phone]  ___[facility]: [phone]  ___[facility]: [phone]  ___[phone]  ___[facility]: [phone]  ___[phone]

## 2018-09-26 NOTE — DISCHARGE INSTRUCTIONS
MRI Contrast Discharge Instructions    The IV contrast you received today will pass out of your body in your  urine. This will happen in the next 24 hours. You will not feel this process.  Your urine will not change color.    Drink at least 4 extra glasses of water or juice today (unless your doctor  has restricted your fluids). This reduces the stress on your kidneys.  You may take your regular medicines.    If you are on dialysis: It is best to have dialysis today.    If you have a reaction: Most reactions happen right away. If you have  any new symptoms after leaving the hospital (such as hives or swelling),  call your hospital at the correct number below. Or call your family doctor.  If you have breathing distress or wheezing, call 911.    Special instructions: ***    I have read and understand the above information.    Signature:______________________________________ Date:___________    Staff:__________________________________________ Date:___________     Time:__________    [place] [facility] Departments:    ___[place]: [phone]  ___[facility]: [phone]  ___[place]: [phone] ___[facility]: [phone]  ___[facility]: [phone]  ___[facility]: [phone]  ___[phone]  ___[facility]: [phone]  ___[phone]

## 2018-09-27 ENCOUNTER — OFFICE VISIT (OUTPATIENT)
Dept: NEUROSURGERY | Facility: CLINIC | Age: 68
End: 2018-09-27
Payer: COMMERCIAL

## 2018-09-27 VITALS
SYSTOLIC BLOOD PRESSURE: 138 MMHG | HEIGHT: 72 IN | BODY MASS INDEX: 23.7 KG/M2 | HEART RATE: 64 BPM | OXYGEN SATURATION: 96 % | WEIGHT: 175 LBS | DIASTOLIC BLOOD PRESSURE: 88 MMHG

## 2018-09-27 DIAGNOSIS — D43.2 NEOPLASM OF UNCERTAIN BEHAVIOR OF BRAIN AND SPINAL CORD (H): Primary | ICD-10-CM

## 2018-09-27 DIAGNOSIS — D43.4 NEOPLASM OF UNCERTAIN BEHAVIOR OF BRAIN AND SPINAL CORD (H): Primary | ICD-10-CM

## 2018-09-27 ASSESSMENT — ENCOUNTER SYMPTOMS
MEMORY LOSS: 0
MYALGIAS: 1
DIZZINESS: 1
PARALYSIS: 0
DISTURBANCES IN COORDINATION: 0
NUMBNESS: 0
ARTHRALGIAS: 0
POSTURAL DYSPNEA: 0
COUGH: 0
WEAKNESS: 0
SHORTNESS OF BREATH: 1
SPEECH CHANGE: 0
BACK PAIN: 1
HEMOPTYSIS: 0
STIFFNESS: 0
SPUTUM PRODUCTION: 0
TREMORS: 0
TINGLING: 1
DYSPNEA ON EXERTION: 0
MUSCLE WEAKNESS: 0
NECK PAIN: 1
WHEEZING: 0
SNORES LOUDLY: 0
MUSCLE CRAMPS: 0
HEADACHES: 0
LOSS OF CONSCIOUSNESS: 0
COUGH DISTURBING SLEEP: 0
SEIZURES: 0
JOINT SWELLING: 0

## 2018-09-27 ASSESSMENT — PAIN SCALES - GENERAL: PAINLEVEL: MILD PAIN (2)

## 2018-09-27 NOTE — MR AVS SNAPSHOT
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  Some cerebral spinal fluid will be collected and analyzed.  Call Dr. Elbert Dangelo' nurse, at [phone] once this test has been set up.  We would like you to come back to clinic to see Dr. Boswell two weeks after they CT myelograms.          Follow-ups after your visit        Future tests that were ordered for you today     Open Future Orders        Priority Expected Expires Ordered    X-ray Myelogram thoracic Routine 9/27/2018 9/27/2019 9/27/2018    CT Thoracic spine w contrast Routine  9/27/2019 9/27/2018    X-ray Cervical myelogram Routine 9/27/2018 9/27/2019 9/27/2018    CT Cervical spine w contrast Routine  9/27/2019 9/27/2018    IR Lumbar Puncture Routine  9/27/2019 9/27/2018    Oligoclonal banding Routine  9/28/2019 9/27/2018    Immunoglobulin G CSF Index: Tube 3 Routine  9/28/2019 9/27/2018    IgG Routine  9/27/2019 9/27/2018            Who to contact     Please call your clinic at [phone] to:    Ask questions about your health    Make or cancel appointments    Discuss your medicines    Learn about your test results    Speak to your doctor            Additional Information About Your Visit        [employer] Information     K[employer] gives you secure access to your electronic health record. If you see a primary care provider, you can also send messages to your care team and make appointments. If you have questions, please call your primary care clinic.  If you do not have a primary care provider, please call [phone] and they will assist you.      Ku is an  "electronic gateway that provides easy, online access to your medical records. With [employer], you can request a clinic appointment, read your test results, renew a prescription or communicate with your care team.     To access your existing account, please contact your [facility] or call [phone] for assistance.        Care EveryWhere ID     This is your Care EveryWhere ID. This could be used by other organizations to access your [place] medical records  [id number]        Your Vitals Were     Pulse Height Pulse Oximetry BMI (Body Mass Index)          64 1.816 m (5' 11.5\") 96% 24.07 kg/m2         Blood Pressure from Last 3 Encounters:   09/27/18 138/88   09/06/18 135/87   04/17/18 147/90    Weight from Last 3 Encounters:   09/27/18 79.4 kg (175 lb)   09/06/18 78.5 kg (173 lb)   04/17/18 78.7 kg (173 lb 6.4 oz)              We Performed the Following     Cell count with differential CSF: Tube 1     CSF Culture Aerobic Bacterial     Cytology non gyn     Glucose CSF: Tube 1     Gram stain     Protein total CSF: Tube 1        Primary Care Provider    None Specified       No primary provider on file.        Equal Access to Services     : Hadii lucita Simental, mony mayfield, diamond vieira, catalina sotelo . So [facility] [phone].    ATENCIÓN: Si habla español, tiene a alegre disposición servicios gratuitos de asistencia lingüística. Llame al [phone].    We comply with applicable federal civil rights laws and [place] laws. We do not discriminate on the basis of race, color, national origin, age, disability, sex, sexual orientation, or gender identity.            Thank you!     Thank you for choosing [facility] NEUROSURGERY  for your care. Our goal is always to provide you with excellent care. Hearing back from our patients is one way we can continue to improve our services. Please take a few minutes to complete the written " survey that you may receive in the mail after your visit with us. Thank you!             Your Updated Medication List - Protect others around you: Learn how to safely use, store and throw away your medicines at www.disposemymeds.org.          This list is accurate as of 9/27/18 11:31 AM.  Always use your most recent med list.                   Brand Name Dispense Instructions for use Diagnosis    LASHAY ASPIRIN EC LOW DOSE 81 MG EC tablet   Generic drug:  aspirin           BOTOX 100 units injection   Generic drug:  botulinum toxin type A     2 each    Inject 800 units over 1 year.  Inject 200 units intramuscularly every 3 months    Cervical dystonia       DULERA 100-5 MCG/ACT oral inhaler   Generic drug:  mometasone-formoterol           fish Oil 1200 MG capsule           magnesium 250 MG tablet           PROAIR  (90 Base) MCG/ACT inhaler   Generic drug:  albuterol           simvastatin 40 MG tablet    ZOCOR          SUPER B-COMPLEX Tabs           vitamin D3 1000 units Caps

## 2018-09-27 NOTE — PATIENT INSTRUCTIONS
Have CT myelograms done with a lumbar puncture.  Some cerebral spinal fluid will be collected and analyzed.  Call Dr. Elbert Dangelo' nurse, at [phone] once this test has been set up.  We would like you to come back to clinic to see Dr. Boswell two weeks after they CT myelograms.

## 2018-09-27 NOTE — PROGRESS NOTES
"Neurosurgery Clinic Note     Chief Complaint: \"I have a tumor in my spinal cord\"     History of Present Illness:  It was a pleasure to evaluate Markel Baron in clinic today at the kind referral of Jagdish Valentine MD  [address] [place] [address].     Patient is here in followup after obtaining:    - an MRI brain/cervical/thoracic/lumbar spine to evaluate any additional mass lesions.  -CT scan of chest to evaluate for the potential calcification that radiologist mentions seeing in thoracic area.   -bladder ultrasound to evaluate for residual post-void urine volume to see if there is significant bladder emptying/spasticity.    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     He was diagnosed with cervical dystonia two years ago. He sees [facility] for Botox injections. He has a left head-turn, never right. Sensory distraction with hand on chin/face seems to help decrease the dystonia.     Patient has no pain related to thoracic spine lesion, no radiating or radicular pain in arms/hands/chest. Has numbness in left great toe. Is not sure whether has bladder urgency changes, he will urinate 2-3 times per night and every several hours during the day. He feels like his balance may be changing. He denies any weakness.     He had one episode of having loose stool incontinence that he did not realize, two months ago. This was an isolated incident.              Review of Systems   Answers for HPI/ROS submitted by the patient on 9/27/2018   General Symptoms: No  Skin Symptoms: No  HENT Symptoms: No  EYE SYMPTOMS: No  HEART SYMPTOMS: No  LUNG SYMPTOMS: Yes  INTESTINAL SYMPTOMS: No  URINARY SYMPTOMS: No  REPRODUCTIVE SYMPTOMS: No  SKELETAL SYMPTOMS: Yes  BLOOD SYMPTOMS: No  NERVOUS SYSTEM SYMPTOMS: Yes  MENTAL HEALTH SYMPTOMS: No  Cough: " No  Sputum or phlegm: No  Coughing up blood: No  Difficulty breating or shortness of breath: Yes  Snoring: No  Wheezing: No  Difficulty breathing on exertion: No  Nighttime Cough: No  Difficulty breathing when lying flat: No  Back pain: Yes  Muscle aches: Yes  Neck pain: Yes  Swollen joints: No  Joint pain: No  Bone pain: No  Muscle cramps: No  Muscle weakness: No  Joint stiffness: No  Bone fracture: No  Trouble with coordination: No  Dizziness or trouble with balance: Yes  Fainting or black-out spells: No  Memory loss: No  Headache: No  Seizures: No  Speech problems: No  Tingling: Yes  Tremor: No  Weakness: No  Difficulty walking: No  Paralysis: No  Numbness: No       Past Medical History- cervical dystonia      Past Surgical History          Past Surgical History:   Procedure Laterality Date     COLONOSCOPY   5 years ago     HERNIA REPAIR   17 years ago     ORTHOPEDIC SURGERY   shoulder 37 years ago, knee cartilage 30 years ago            Social History            Social History     Marital status:        Spouse name: N/A     Number of children: N/A     Years of education: N/A            Social History Main Topics     Smoking status: Former Smoker       Packs/day: 1.50       Years: 10.00       Types: Cigarettes, Dip, chew, snus or snuff       Quit date: 1985     Smokeless tobacco: Former User       Quit date: 1985     Alcohol use No     Drug use: No     Sexual activity: No            Other Topics Concern     Parent/Sibling W/ Cabg, Mi Or Angioplasty Before 65f 55m? Yes       mother was 65 when she  of heart attack      Social History Narrative      Family History- no known dystonia      IMAGING per my own measurement and interpretation:  Xrays:standing long cassette 18     No scoliosis, normal sagittal alignment        MRI thoracic spine with contrast  at outside hospital:  T1 and T2 hyperintense intradural mass lesion upper thoracic spine, non-enhancing        Resulted  "Imaging/Labs:  Bone Density:  No results found.     Vitamin D:  Vitamin D Deficiency Screening Results:  No results found for: VITDT  No results found for: PYR413, XYFC492, [id number], VITD3, D2VIT, D3VIT, DTOT, [id number], [id number], [id number], [id number], [id number], [id number]        Nutritional Status:  Estimated body mass index is 23.79 kg/(m^2) as calculated from the following:    Height as of this encounter: 1.816 m (5' 11.5\").    Weight as of this encounter: 78.5 kg (173 lb).     No results found for: ALBUMIN     Diabetes Screening:  No results found for: A1C     Nicotine Usage:     No but former                      Physical Exam   /87  Pulse 65  Temp 98.2  F (36.8  C) (Oral)  Resp 18  Ht 1.816 m (5' 11.5\")  Wt 78.5 kg (173 lb)  SpO2 97%  BMI 23.79 kg/m2  Constitutional: Oriented to person, place, and time. Appears well-developed and well-nourished. Cooperative. No distress.   HENT:   Head: Normocephalic and atraumatic.   Eyes: Conjunctivae are normal.  Neck: No spinous process tenderness and no muscular tenderness present. No tracheal deviation present.  Cardiovascular: Normal rate and regular rhythm.    Pulmonary/Chest: Effort normal and breath sounds normal.  Abdominal: Soft. Bowel sounds are normal. Exhibits no distension. There is no tenderness.   Musculoskeletal:   Cervical flexion-extension range of motion: leftward chin tilt and left torticollis  Lumbar flexion/extension range of motion: normal    Neurological: alert and oriented to person, place, and time.   No cranial nerve deficit   sensory deficit left great toe  Gait normal  Babinski downgoing toes     Reflex Scores:        Tricep reflexes are 2+ on the right side and 2+ on the left side.       Bicep reflexes are 2+ on the right side and 2+ on the left side.       Brachioradialis reflexes are 2+ on the right side and 2+ on the left side.       Patellar reflexes are 2+ on the right side and 2+ on the left side.       Achilles " reflexes are 2+ on the right side and 2+ on the left side.     STRENGTH LEFT RIGHT   Deltoid 5 5   Bicep 5 5   Wrist Extensor 5 5   Tricep 5 5   Finger flexion 5 5   Finger abduction 5 5    5 5         Hip Flexion       5       5   Knee Extension 5 5   Ankle Dorsiflexion 5 5   Extensor Hallucis Longus 5 5   Plantar Flexion 5 5   Foot eversion 5 5   Foot inversion 5 5     No Lhermitte's, No Spurling's  No Danica's   No ankle clonus  Able to tandem walk with mild difficulty     Skin: Skin is warm, dry and intact.   Psychiatric: Normal mood and affect. Speech is normal and behavior is normal.           ASSESSMENT:  Markel Baron is a 67 year old male with intradural thoracic mass lesion, and cervical dystonia     PLAN:     No additional large lesions on MRI brain/cervical/thoracic/lumbar spine   CT scan of chest shows calcification in part of intracanal mass lesion   bladder ultrasound shows no significant residual post-void urine volume        We will get a CT myelogram to evaluate for CSF flow around mass lesion, and send CSF for cytology and routine cell count/protein/glucose/culture, also oligoclonal banding, IgG index.    Patient to return with above completed.              Isadora Boswell MD    [facility] Department of Neurosurgery  Complex Spinal Deformity, Scoliosis, and Minimally Invasive Spine Surgery Specialist  Office: [phone]

## 2018-09-27 NOTE — LETTER
"9/27/2018       RE: Markel Baron  [address] [address]     Dear Colleague,    Thank you for referring your patient, Markel Baron, to the [facility] NEUROSURGERY at [facility]. Please see a copy of my visit note below.    Neurosurgery Clinic Note     Chief Complaint: \"I have a tumor in my spinal cord\"     History of Present Illness:  It was a pleasure to evaluate Markel Baron in clinic today at the kind referral of Jagdish Valentine MD  [address] [id number].     Patient is here in followup after obtaining:    - an MRI brain/cervical/thoracic/lumbar spine to evaluate any additional mass lesions.  -CT scan of chest to evaluate for the potential calcification that radiologist mentions seeing in thoracic area.   -bladder ultrasound to evaluate for residual post-void urine volume to see if there is significant bladder emptying/spasticity.    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     He was diagnosed with cervical dystonia two years ago. He sees [facility] for Botox injections. He has a left head-turn, never right. Sensory distraction with hand on chin/face seems to help decrease the dystonia.     Patient has no pain related to thoracic spine lesion, no radiating or radicular pain in arms/hands/chest. Has numbness in left great toe. Is not sure whether has bladder urgency changes, he will urinate 2-3 times per night and every several hours during the day. He feels like his balance may be changing. He denies any weakness.     He had one episode of having loose stool incontinence that he did not realize, two months ago. This was an isolated incident.              Review of Systems   Answers for HPI/ROS submitted by the patient on 9/27/2018   General Symptoms: " No  Skin Symptoms: No  HENT Symptoms: No  EYE SYMPTOMS: No  HEART SYMPTOMS: No  LUNG SYMPTOMS: Yes  INTESTINAL SYMPTOMS: No  URINARY SYMPTOMS: No  REPRODUCTIVE SYMPTOMS: No  SKELETAL SYMPTOMS: Yes  BLOOD SYMPTOMS: No  NERVOUS SYSTEM SYMPTOMS: Yes  MENTAL HEALTH SYMPTOMS: No  Cough: No  Sputum or phlegm: No  Coughing up blood: No  Difficulty breating or shortness of breath: Yes  Snoring: No  Wheezing: No  Difficulty breathing on exertion: No  Nighttime Cough: No  Difficulty breathing when lying flat: No  Back pain: Yes  Muscle aches: Yes  Neck pain: Yes  Swollen joints: No  Joint pain: No  Bone pain: No  Muscle cramps: No  Muscle weakness: No  Joint stiffness: No  Bone fracture: No  Trouble with coordination: No  Dizziness or trouble with balance: Yes  Fainting or black-out spells: No  Memory loss: No  Headache: No  Seizures: No  Speech problems: No  Tingling: Yes  Tremor: No  Weakness: No  Difficulty walking: No  Paralysis: No  Numbness: No       Past Medical History- cervical dystonia      Past Surgical History          Past Surgical History:   Procedure Laterality Date     COLONOSCOPY   5 years ago     HERNIA REPAIR   17 years ago     ORTHOPEDIC SURGERY   shoulder 37 years ago, knee cartilage 30 years ago            Social History            Social History     Marital status:        Spouse name: N/A     Number of children: N/A     Years of education: N/A            Social History Main Topics     Smoking status: Former Smoker       Packs/day: 1.50       Years: 10.00       Types: Cigarettes, Dip, chew, snus or snuff       Quit date: 1985     Smokeless tobacco: Former User       Quit date: 1985     Alcohol use No     Drug use: No     Sexual activity: No            Other Topics Concern     Parent/Sibling W/ Cabg, Mi Or Angioplasty Before 65f 55m? Yes       mother was 65 when she  of heart attack      Social History Narrative      Family History- no known dystonia      IMAGING per my own measurement  "and interpretation:  Xrays:standing long cassette 09/06/18     No scoliosis, normal sagittal alignment        MRI thoracic spine with contrast 5/18 at outside hospital:  T1 and T2 hyperintense intradural mass lesion upper thoracic spine, non-enhancing        Resulted Imaging/Labs:  Bone Density:  No results found.     Vitamin D:  Vitamin D Deficiency Screening Results:  No results found for: VITDT  No results found for: RWV597, EQKF040, [id number], VITD3, D2VIT, D3VIT, DTOT, [id number], [id number], [id number], [id number], [id number], [id number]        Nutritional Status:  Estimated body mass index is 23.79 kg/(m^2) as calculated from the following:    Height as of this encounter: 1.816 m (5' 11.5\").    Weight as of this encounter: 78.5 kg (173 lb).     No results found for: ALBUMIN     Diabetes Screening:  No results found for: A1C     Nicotine Usage:     No but former                      Physical Exam   /87  Pulse 65  Temp 98.2  F (36.8  C) (Oral)  Resp 18  Ht 1.816 m (5' 11.5\")  Wt 78.5 kg (173 lb)  SpO2 97%  BMI 23.79 kg/m2  Constitutional: Oriented to person, place, and time. Appears well-developed and well-nourished. Cooperative. No distress.   HENT:   Head: Normocephalic and atraumatic.   Eyes: Conjunctivae are normal.  Neck: No spinous process tenderness and no muscular tenderness present. No tracheal deviation present.  Cardiovascular: Normal rate and regular rhythm.    Pulmonary/Chest: Effort normal and breath sounds normal.  Abdominal: Soft. Bowel sounds are normal. Exhibits no distension. There is no tenderness.   Musculoskeletal:   Cervical flexion-extension range of motion: leftward chin tilt and left torticollis  Lumbar flexion/extension range of motion: normal    Neurological: alert and oriented to person, place, and time.   No cranial nerve deficit   sensory deficit left great toe  Gait normal  Babinski downgoing toes     Reflex Scores:        Tricep reflexes are 2+ on the right side " and 2+ on the left side.       Bicep reflexes are 2+ on the right side and 2+ on the left side.       Brachioradialis reflexes are 2+ on the right side and 2+ on the left side.       Patellar reflexes are 2+ on the right side and 2+ on the left side.       Achilles reflexes are 2+ on the right side and 2+ on the left side.     STRENGTH LEFT RIGHT   Deltoid 5 5   Bicep 5 5   Wrist Extensor 5 5   Tricep 5 5   Finger flexion 5 5   Finger abduction 5 5    5 5         Hip Flexion       5       5   Knee Extension 5 5   Ankle Dorsiflexion 5 5   Extensor Hallucis Longus 5 5   Plantar Flexion 5 5   Foot eversion 5 5   Foot inversion 5 5     No Lhermitte's, No Spurling's  No Danica's   No ankle clonus  Able to tandem walk with mild difficulty     Skin: Skin is warm, dry and intact.   Psychiatric: Normal mood and affect. Speech is normal and behavior is normal.           ASSESSMENT:  Markel Baron is a 67 year old male with intradural thoracic mass lesion, and cervical dystonia     PLAN:     No additional large lesions on MRI brain/cervical/thoracic/lumbar spine   CT scan of chest shows calcification in part of intracanal mass lesion   bladder ultrasound shows no significant residual post-void urine volume        We will get a CT myelogram to evaluate for CSF flow around mass lesion, and send CSF for cytology and routine cell count/protein/glucose/culture, also oligoclonal banding, IgG index.    Patient to return with above completed.              Isadora Boswell MD    [facility] Department of Neurosurgery  Complex Spinal Deformity, Scoliosis, and Minimally Invasive Spine Surgery Specialist  Office: [phone]

## 2018-10-09 ENCOUNTER — PATIENT OUTREACH (OUTPATIENT)
Dept: NEUROSURGERY | Facility: CLINIC | Age: 68
End: 2018-10-09

## 2018-10-25 ENCOUNTER — PATIENT OUTREACH (OUTPATIENT)
Dept: NEUROSURGERY | Facility: CLINIC | Age: 68
End: 2018-10-25

## 2018-10-29 DIAGNOSIS — D17.79 LIPOMA OF SPINAL CORD: Primary | ICD-10-CM

## 2018-11-01 NOTE — PROGRESS NOTES
Per Dr Nolasco - pt can be scheduled for both Lumbar Puncture & CT Myelogram on the same day. Post care should be to follow post Myelogram orders & keep HOB elevated.

## 2018-11-08 ENCOUNTER — HOSPITAL ENCOUNTER (OUTPATIENT)
Dept: GENERAL RADIOLOGY | Facility: CLINIC | Age: 68
End: 2018-11-08
Attending: NEUROLOGICAL SURGERY | Admitting: COLON & RECTAL SURGERY
Payer: COMMERCIAL

## 2018-11-08 ENCOUNTER — HOSPITAL ENCOUNTER (OUTPATIENT)
Dept: CT IMAGING | Facility: CLINIC | Age: 68
End: 2018-11-08
Attending: NEUROLOGICAL SURGERY | Admitting: COLON & RECTAL SURGERY
Payer: COMMERCIAL

## 2018-11-08 ENCOUNTER — HOSPITAL ENCOUNTER (OUTPATIENT)
Facility: CLINIC | Age: 68
Discharge: HOME | End: 2018-11-08
Admitting: NEUROLOGICAL SURGERY
Payer: COMMERCIAL

## 2018-11-08 VITALS
TEMPERATURE: 96.5 F | HEART RATE: 74 BPM | RESPIRATION RATE: 16 BRPM | OXYGEN SATURATION: 98 % | SYSTOLIC BLOOD PRESSURE: 125 MMHG | DIASTOLIC BLOOD PRESSURE: 73 MMHG

## 2018-11-08 DIAGNOSIS — D17.79 LIPOMA OF SPINAL CORD: ICD-10-CM

## 2018-11-08 DIAGNOSIS — D43.4 NEOPLASM OF UNCERTAIN BEHAVIOR OF BRAIN AND SPINAL CORD (H): ICD-10-CM

## 2018-11-08 DIAGNOSIS — D43.2 NEOPLASM OF UNCERTAIN BEHAVIOR OF BRAIN AND SPINAL CORD (H): ICD-10-CM

## 2018-11-08 LAB
APPEARANCE CSF: CLEAR
COLOR CSF: COLORLESS
GLUCOSE CSF-MCNC: 57 MG/DL (ref 40–70)
GRAM STN SPEC: NORMAL
PROT CSF-MCNC: 38 MG/DL (ref 15–60)
RBC # CSF MANUAL: 3 /UL (ref 0–2)
SPECIMEN SOURCE: NORMAL
TUBE # CSF: 2 #
WBC # CSF MANUAL: 5 /UL (ref 0–5)

## 2018-11-08 PROCEDURE — 25000125 ZZHC RX 250: Performed by: RADIOLOGY

## 2018-11-08 PROCEDURE — 72126 CT NECK SPINE W/DYE: CPT

## 2018-11-08 PROCEDURE — 82945 GLUCOSE OTHER FLUID: CPT | Performed by: COLON & RECTAL SURGERY

## 2018-11-08 PROCEDURE — 87205 SMEAR GRAM STAIN: CPT | Performed by: COLON & RECTAL SURGERY

## 2018-11-08 PROCEDURE — 62305 MYELOGRAPHY LUMBAR INJECTION: CPT

## 2018-11-08 PROCEDURE — 88108 CYTOPATH CONCENTRATE TECH: CPT | Performed by: NEUROLOGICAL SURGERY

## 2018-11-08 PROCEDURE — 40000863 ZZH STATISTIC RADIOLOGY XRAY, US, CT, MAR, NM

## 2018-11-08 PROCEDURE — 25000125 ZZHC RX 250: Performed by: OPHTHALMOLOGY

## 2018-11-08 PROCEDURE — 88108 CYTOPATH CONCENTRATE TECH: CPT | Mod: 26 | Performed by: NEUROLOGICAL SURGERY

## 2018-11-08 PROCEDURE — 00000102 ZZHCL STATISTIC CYTO WRIGHT STAIN TC: Performed by: NEUROLOGICAL SURGERY

## 2018-11-08 PROCEDURE — 82040 ASSAY OF SERUM ALBUMIN: CPT | Performed by: COLON & RECTAL SURGERY

## 2018-11-08 PROCEDURE — 82784 ASSAY IGA/IGD/IGG/IGM EACH: CPT | Performed by: COLON & RECTAL SURGERY

## 2018-11-08 PROCEDURE — 72129 CT CHEST SPINE W/DYE: CPT

## 2018-11-08 PROCEDURE — 62270 DX LMBR SPI PNXR: CPT | Mod: XU

## 2018-11-08 PROCEDURE — 25500064 ZZH RX 255 OP 636: Performed by: RADIOLOGY

## 2018-11-08 PROCEDURE — 87015 SPECIMEN INFECT AGNT CONCNTJ: CPT | Performed by: COLON & RECTAL SURGERY

## 2018-11-08 PROCEDURE — 84157 ASSAY OF PROTEIN OTHER: CPT | Performed by: COLON & RECTAL SURGERY

## 2018-11-08 PROCEDURE — 83916 OLIGOCLONAL BANDS: CPT | Performed by: COLON & RECTAL SURGERY

## 2018-11-08 PROCEDURE — 87070 CULTURE OTHR SPECIMN AEROBIC: CPT | Performed by: COLON & RECTAL SURGERY

## 2018-11-08 PROCEDURE — 82042 OTHER SOURCE ALBUMIN QUAN EA: CPT | Performed by: COLON & RECTAL SURGERY

## 2018-11-08 PROCEDURE — 89050 BODY FLUID CELL COUNT: CPT | Performed by: COLON & RECTAL SURGERY

## 2018-11-08 RX ORDER — LIDOCAINE HYDROCHLORIDE 10 MG/ML
5 INJECTION, SOLUTION EPIDURAL; INFILTRATION; INTRACAUDAL; PERINEURAL ONCE
Status: COMPLETED | OUTPATIENT
Start: 2018-11-08 | End: 2018-11-08

## 2018-11-08 RX ORDER — BUDESONIDE AND FORMOTEROL FUMARATE DIHYDRATE 80; 4.5 UG/1; UG/1
2 AEROSOL RESPIRATORY (INHALATION) DAILY
COMMUNITY

## 2018-11-08 RX ORDER — IOPAMIDOL 408 MG/ML
10 INJECTION, SOLUTION INTRATHECAL ONCE
Status: COMPLETED | OUTPATIENT
Start: 2018-11-08 | End: 2018-11-08

## 2018-11-08 RX ADMIN — LIDOCAINE HYDROCHLORIDE 1.5 ML: 10 INJECTION, SOLUTION EPIDURAL; INFILTRATION; INTRACAUDAL; PERINEURAL at 11:08

## 2018-11-08 RX ADMIN — LIDOCAINE HYDROCHLORIDE 1.5 ML: 10 INJECTION, SOLUTION EPIDURAL; INFILTRATION; INTRACAUDAL; PERINEURAL at 10:11

## 2018-11-08 RX ADMIN — IOPAMIDOL 12 ML: 408 INJECTION, SOLUTION INTRATHECAL at 11:04

## 2018-11-08 NOTE — PROGRESS NOTES
Returned to [facility] post LP/myelogram at 1105. Continues to deny h/a or any discomfort. Taking PO fluids well, and pt was encouraged to push PO fluids. AVs given to pt and wife. LP site soft, clean, dry with bandaid. Pt has head elevated slightly, approx 23-30 degrees as per Dr. Colbert's telephone order, and pt instructed to keep HOB slightly elevated for next 24 hours as per Dr. Colbert. Discharged to home per w/c with wife driving in stable condition.

## 2018-11-08 NOTE — IP AVS SNAPSHOT
MRN:[id number]                      After Visit Summary   11/8/2018    Markel Baron    MRN: [id number]           Visit Information        Department      11/8/2018  7:18 AM [facility]          Review of your medicines      UNREVIEWED medicines. Ask your doctor about these medicines        Dose / Directions    LASHAY ASPIRIN EC LOW DOSE 81 MG EC tablet   Generic drug:  aspirin        Refills:  0       BOTOX 100 units injection   Used for:  Cervical dystonia   Generic drug:  botulinum toxin type A        Inject 800 units over 1 year.  Inject 200 units intramuscularly every 3 months   Quantity:  2 each   Refills:  3       budesonide-formoterol 80-4.5 MCG/ACT Inhaler   Commonly known as:  SYMBICORT        Dose:  2 puff   Inhale 2 puffs into the lungs daily   Refills:  0       fish Oil 1200 MG capsule        Refills:  0       PROAIR  (90 Base) MCG/ACT inhaler   Generic drug:  albuterol        Refills:  0       simvastatin 40 MG tablet   Commonly known as:  ZOCOR        Dose:  40 mg   40 mg At Bedtime   Refills:  0       SUPER B-COMPLEX Tabs        Refills:  0       vitamin D3 1000 units Caps        Dose:  2000 Units   2,000 Units daily   Refills:  0                Protect others around you: Learn how to safely use, store and throw away your medicines at www.disposemymeds.org.         Follow-ups after your visit        Your next 10 appointments already scheduled     Nov 08, 2018  9:00 AM CST   XR LUMBAR PUNCTURE SPINAL TAP DIAGNOSTIC with SHXR4   [facility] Radiology ([facility])    [address] [place] [id number]   [phone]           How do I prepare for my exam? (Food and drink instructions) No Food and Drink Restrictions.  How do I prepare for my exam? (Other instructions) * If you take Coumadin (or any other blood thinners) you may need to stop taking them up to 5 days prior to the exam. Talk to your doctor before stopping. * If  you take Plavix, Ticlid, Pletal or Persantine, please ask your doctor if you should stop these medicines. You may need extra tests on the morning of your scan. * If you take Xarelto (Rivaroxaban), you may need to stop taking it 24 hours before treatment. Talk to your doctor before stopping this medicine.  What should I wear: Wear comfortable clothes.  How long does the exam take: Injections take about 30 to 45 minutes. Most people spend up to 2 hours in the clinic or hospital.  What should I bring: Please bring any scans or X-rays taken at other hospitals, if similar tests were done. Also bring a list of your medicines, including vitamins, minerals and over-the-counter drugs. It is safest to leave personal items at home.  Do I need a :  Plan to have someone drive you home afterward.  What do I need to tell my doctor: Tell your doctor in advance: * If you are allergic to X-ray dye (contrast fluid). * If you may be pregnant.  What should I do after the exam: You may have slight cramping during this exam. The cramps should go away afterward. You may have some spotting after the exam.  What is this test: A spinal shot is done in or near the spine. You may receive steroid medicine (to reduce swelling) or contrast fluid (dye that makes the area show up more clearly on X-rays). A nerve root shot is a shot into the nerve near the spine. It may reduce inflammation near the nerve root or spinal cord and reduce pain in the arm or leg.  Who should I call with questions: If you have any questions, please call the Imaging Department where you will have your exam. Directions, parking instructions, and other information are available on our website, [email or web address].            Nov 08, 2018 10:00 AM CST   XR MYELOGRAM THORACIC SPINE with SHXR4   [facility] Radiology ([facility])    [address] [place] [id number] [phone]           How do I prepare for my exam? (Food and drink  instructions) Drink at least four to six glasses of water the night before your exam.  How do I prepare for my exam? (Other instructions) * If you take blood thinners, you may need to stop taking them a few days before treatment. Talk to your doctor before stopping these medicines. Stop taking Coumadin (warfarin) 3 days before treatment. Restart the day after treatment. * If you take Plavix, Ticlid, Pletal or Persantine, please ask your doctor if you should stop these medicines. You may need extra tests on the morning of your scan. * Stop taking medicine for depression or other mental health concerns 24 hours before your exam, if your doctor says it is safe to do so.  You may take your other medicines as normal.  What should I wear: Please wear loose clothing, such as a sweat suit or jogging clothes. Avoid snaps, zippers and other metal. We may ask you to undress and put on a hospital gown.  How long does the exam take: Injections take about 30 to 45 minutes. Most people spend up to 2 hours in the clinic or hospital.  What should I bring: Please bring any scans or X-rays taken at other hospitals, if similar tests were done. Also bring a list of your medicines, including vitamins, minerals and over-the-counter drugs. It is safest to leave personal items at home.  Do I need a :  Someone will need to drive you to and from the clinic/hospital.  What do I need to tell my doctor: Tell your doctor if: * You have ever had an allergic reaction to X-ray dye (contrast fluid). * If there s any chance you are pregnant.  What should I do after the exam: We may ask you to lie down for a short time before you go home. You may return to your normal activities the next day.  What is this test: A myelogram is an X-ray exam of the spinal cord and the nerves around the spine. You will receive a shot of contrast fluid near the spine (most likely in the lower back).  Who should I call with questions: If you have any questions,  please call the Imaging Department where you will have your exam. Directions, parking instructions, and other information are available on our website, [email or web address].            Nov 08, 2018 11:00 AM CST   XR MYELOGRAM CERVICAL SPINE with SHXR4   [facility] Radiology ([facility])    [address] [place] [address]   [phone]           How do I prepare for my exam? (Food and drink instructions) Drink at least four to six glasses of water the night before your exam.  How do I prepare for my exam? (Other instructions) * If you take blood thinners, you may need to stop taking them a few days before treatment. Talk to your doctor before stopping these medicines. Stop taking Coumadin (warfarin) 3 days before treatment. Restart the day after treatment. * If you take Plavix, Ticlid, Pletal or Persantine, please ask your doctor if you should stop these medicines. You may need extra tests on the morning of your scan. * Stop taking medicine for depression or other mental health concerns 24 hours before your exam, if your doctor says it is safe to do so.  You may take your other medicines as normal.  What should I wear: Please wear loose clothing, such as a sweat suit or jogging clothes. Avoid snaps, zippers and other metal. We may ask you to undress and put on a hospital gown.  How long does the exam take: Injections take about 30 to 45 minutes. Most people spend up to 2 hours in the clinic or hospital.  What should I bring: Please bring any scans or X-rays taken at other hospitals, if similar tests were done. Also bring a list of your medicines, including vitamins, minerals and over-the-counter drugs. It is safest to leave personal items at home.  Do I need a :  Someone will need to drive you to and from the clinic/hospital.  What do I need to tell my doctor: Tell your doctor if: * You have ever had an allergic reaction to X-ray dye (contrast fluid). * If there s any  chance you are pregnant.  What should I do after the exam: We may ask you to lie down for a short time before you go home. You may return to your normal activities the next day.  What is this test: A myelogram is an X-ray exam of the spinal cord and the nerves around the spine. You will receive a shot of contrast fluid near the spine (most likely in the lower back).  Who should I call with questions: If you have any questions, please call the Imaging Department where you will have your exam. Directions, parking instructions, and other information are available on our website, [email or web address].            Nov 08, 2018 12:00 PM CST   CT THORACIC SPINE W CONTRAST with SHCT1   [facility] CT ([facility])    [address] [place] [id number] [phone]           How do I prepare for my exam? (Food and drink instructions) **You will have contrast for this exam.** Do not eat or drink for 2 hours before your exam. If you need to take medicine, you may take it with small sips of water. (We may ask you to take liquid medicine as well.)  The day before your exam, drink extra fluids at least six 8-ounce glasses (unless your doctor tells you to restrict your fluids).  How do I prepare for my exam? (Other instructions) Patients over 70 or patients with diabetes or kidney problems: If you haven t had a blood test (creatinine test) within the last 30 days, the Cardiologist/Radiologist may require you to get this test prior to your exam.  What should I wear: Please wear loose clothing, such as a sweat suit or jogging clothes.  Avoid snaps, zippers and other metal. We may ask you to undress and put on a hospital gown.  How long does the exam take: Most scans take less than 20 minutes.  What should I bring: Please bring any scans or X-rays taken at other hospitals, if similar tests were done. Also bring a list of your medicines, including vitamins, minerals and over-the-counter drugs. It is  safest to leave personal items at home.  Do I need a :  No  is needed.  What do I need to tell my doctor? Be sure to tell your doctor: * If you have any allergies. * If there s any chance you are pregnant. * If you are breastfeeding. * If you have diabetes as your medication may need to be adjusted for this exam.  What should I do after the exam: No restrictions, You may resume normal activities.  What is this test: A CT (computed tomography) scan is a series of pictures that allows us to look inside your body. The scanner creates images of the body in cross sections, much like slices of bread. This helps us see any problems more clearly. You may receive contrast (X-ray dye) before or during your scan. Contrast is given through an IV (small needle in your arm).  Who should I call with questions: If you have any questions, please call the Imaging Department where you will have your exam. Directions, parking instructions, and other information is available on our website, [email or web address].            Nov 08, 2018 12:30 PM CST   CT CERVICAL SPINE W CONTRAST with SHCT1   [facility] CT ([facility])    [address] [place] [id number] [phone]           How do I prepare for my exam? (Food and drink instructions) **You will have contrast for this exam.** Do not eat or drink for 2 hours before your exam. If you need to take medicine, you may take it with small sips of water. (We may ask you to take liquid medicine as well.)  The day before your exam, drink extra fluids at least six 8-ounce glasses (unless your doctor tells you to restrict your fluids).  How do I prepare for my exam? (Other instructions) Patients over 70 or patients with diabetes or kidney problems: If you haven t had a blood test (creatinine test) within the last 30 days, the Cardiologist/Radiologist may require you to get this test prior to your exam.  What should I wear: Please wear loose  clothing, such as a sweat suit or jogging clothes.  Avoid snaps, zippers and other metal. We may ask you to undress and put on a hospital gown.  How long does the exam take: Most scans take less than 20 minutes.  What should I bring: Please bring any scans or X-rays taken at other hospitals, if similar tests were done. Also bring a list of your medicines, including vitamins, minerals and over-the-counter drugs. It is safest to leave personal items at home.  Do I need a :  No  is needed.  What do I need to tell my doctor? Be sure to tell your doctor: * If you have any allergies. * If there s any chance you are pregnant. * If you are breastfeeding. * If you have diabetes as your medication may need to be adjusted for this exam.  What should I do after the exam: No restrictions, You may resume normal activities.  What is this test: A CT (computed tomography) scan is a series of pictures that allows us to look inside your body. The scanner creates images of the body in cross sections, much like slices of bread. This helps us see any problems more clearly. You may receive contrast (X-ray dye) before or during your scan. Contrast is given through an IV (small needle in your arm).  Who should I call with questions: If you have any questions, please call the Imaging Department where you will have your exam. Directions, parking instructions, and other information is available on our website, [email or web address].            Nov 29, 2018 11:45 AM CST   (Arrive by 11:30 AM)   Return Visit with Isadora Boswell MD   [facility] Neurosurgery ([facility])    [address] [place] [address] [phone]               Care Instructions        Further instructions from your care team       Lumbar Puncture Discharge Instructions     After you go home:      You may resume your normal diet    Continue to drink at least 8 ounces of fluid every 1-2 hours until bedtime  tonight and continue to drink extra fluids for the next 2 days    Caffeinated beverages may help prevent or reduce spinal headaches    Care of Puncture Site:      If there is a bandaid - you may remove it tomorrow morning    You may shower tomorrow    No tub baths, whirlpools or swimming for 48 hours     Activity - to help prevent spinal headache or spinal fluid leakage:      Minimize your activity today. Flat bedrest for 24 hrs is strongly suggested as this will help to prevent a spinal headache.  You can be up to the bathroom and for meals.    Resume normal activities tomorrow.     Avoid strenuous activity for the next 2 days    Do not drive a vehicle until tomorrow morning    Medicines:      You may resume all medications    Resume your Warfarin/Coumadin at your regular dose today. Follow up with your provider to have your INR rechecked    Resume your Platelet Inhibitors and Aspirin tomorrow at your regular dose    For minor pain, you may take Acetaminophen (Tylenol) or Ibuprofen (Advil)            Call the provider who ordered this procedure if:      Your headache becomes worse or is severe. (A minor headache is not unusual)    You have nausea or vomiting    The site is red, swollen, hot or tender    You have chills or a fever greater than 101 F (38 C)    Any questions or concerns    If you have questions call:        [facility] Radiology Dept @ [phone]    The provider who performed your procedure was _Dr. Colbert________________.  Myelogram Discharge Instructions     After you go home:      You may resume your normal diet    Continue to drink at least 8 ounces of fluid every 1-2 hours until bedtime tonight and continue to drink extra fluids for the next 2 days    Caffeinated beverages may help prevent or reduce spinal headaches    Care of Puncture Site:      If there is a bandaid on your back - you may remove it tomorrow morning    You may shower tomorrow    No tub baths, whirlpools or swimming for  3 days     Activity - To help prevent spinal headache or spinal fluid leakage:      Minimize your activity today. Bedrest for 24 hrs is strongly suggested as this will help to prevent a spinal headache.  You can be up to the bathroom and for meals.    Keep your head up on extra pillows while in bed today    Resume normal activities tomorrow.     Avoid strenuous activity for the next 2 days    Do not drive a motor vehicle until tomorrow morning    Medicines:      You may resume all medications    For minor pain, you may take Acetaminophen (Tylenol) or Ibuprofen (Advil)            Call the doctor who ordered this test if:      Your headache becomes worse or is severe. (A minor headache is not unusual)    You have nausea or vomiting    You develop a stiff neck    The site is red, swollen, hot or tender    You have chills or a fever greater than 101 F (38 C)    Any questions or concerns      If you have questions call:        [facility] Radiology Dept @ [phone]      The provider who performed your procedure was _Dr. Colbert________________.     Additional Information About Your Visit        [employer] Information     [employer] gives you secure access to your electronic health record. If you see a primary care provider, you can also send messages to your care team and make appointments. If you have questions, please call your primary care clinic.  If you do not have a primary care provider, please call [phone] and they will assist you.        Care EveryWhere ID     This is your Care EveryWhere ID. This could be used by other organizations to access your [facility] medical records  [id number]        Your Vitals Were     Blood Pressure Pulse Temperature Respirations Pulse Oximetry       155/88 (BP Location: Left arm) 74 96.5  F (35.8  C) (Oral) 18 98%        Primary Care Provider Office Phone # Fax #    [facility] [phone]      Equal Access to Services     NATHALIA NEVAREZ :  Hadii aad ku hadbrandyhenrique Somariluzali, waaxda luqadaha, qaybta kaalvitor vieira, catalina rosalindain hayaaramon laytontawanda ayala lasinghramon yarely. So [facility] [phone].    ATENCIÓN: Si habla naomy, tiene a alegre disposición servicios gratuitos de asistencia lingüística. Llame al [phone].    We comply with applicable federal civil rights laws and [place] laws. We do not discriminate on the basis of race, color, national origin, age, disability, sex, sexual orientation, or gender identity.            Thank you!     Thank you for choosing [facility] for your care. Our goal is always to provide you with excellent care. Hearing back from our patients is one way we can continue to improve our services. Please take a few minutes to complete the written survey that you may receive in the mail after you visit with us. Thank you!             Medication List: This is a list of all your medications and when to take them. Check marks below indicate your daily home schedule. Keep this list as a reference.      Medications           Morning Afternoon Evening Bedtime As Needed    LASHAY ASPIRIN EC LOW DOSE 81 MG EC tablet   Generic drug:  aspirin                                BOTOX 100 units injection   Inject 800 units over 1 year.  Inject 200 units intramuscularly every 3 months   Generic drug:  botulinum toxin type A                                budesonide-formoterol 80-4.5 MCG/ACT Inhaler   Commonly known as:  SYMBICORT   Inhale 2 puffs into the lungs daily                                fish Oil 1200 MG capsule                                PROAIR  (90 Base) MCG/ACT inhaler   Generic drug:  albuterol                                simvastatin 40 MG tablet   Commonly known as:  ZOCOR   40 mg At Bedtime                                SUPER B-COMPLEX Tabs                                vitamin D3 1000 units Caps   2,000 Units daily

## 2018-11-08 NOTE — DISCHARGE INSTRUCTIONS
Lumbar Puncture Discharge Instructions     After you go home:      You may resume your normal diet    Continue to drink at least 8 ounces of fluid every 1-2 hours until bedtime tonight and continue to drink extra fluids for the next 2 days    Caffeinated beverages may help prevent or reduce spinal headaches    Care of Puncture Site:      If there is a bandaid - you may remove it tomorrow morning    You may shower tomorrow    No tub baths, whirlpools or swimming for 48 hours     Activity - to help prevent spinal headache or spinal fluid leakage:      Minimize your activity today. Flat bedrest for 24 hrs is strongly suggested as this will help to prevent a spinal headache.  You can be up to the bathroom and for meals.    Resume normal activities tomorrow.     Avoid strenuous activity for the next 2 days    Do not drive a vehicle until tomorrow morning    Medicines:      You may resume all medications    Resume your Warfarin/Coumadin at your regular dose today. Follow up with your provider to have your INR rechecked    Resume your Platelet Inhibitors and Aspirin tomorrow at your regular dose    For minor pain, you may take Acetaminophen (Tylenol) or Ibuprofen (Advil)            Call the provider who ordered this procedure if:      Your headache becomes worse or is severe. (A minor headache is not unusual)    You have nausea or vomiting    The site is red, swollen, hot or tender    You have chills or a fever greater than 101 F (38 C)    Any questions or concerns    If you have questions call:        [facility] Radiology Dept @ [phone]    The provider who performed your procedure was _Dr. Colbert________________.  Myelogram Discharge Instructions     After you go home:      You may resume your normal diet    Continue to drink at least 8 ounces of fluid every 1-2 hours until bedtime tonight and continue to drink extra fluids for the next 2 days    Caffeinated beverages may help prevent or reduce spinal  headaches    Care of Puncture Site:      If there is a bandaid on your back - you may remove it tomorrow morning    You may shower tomorrow    No tub baths, whirlpools or swimming for 3 days     Activity - To help prevent spinal headache or spinal fluid leakage:      Minimize your activity today. Bedrest for 24 hrs is strongly suggested as this will help to prevent a spinal headache.  You can be up to the bathroom and for meals.    Keep your head up on extra pillows while in bed today    Resume normal activities tomorrow.     Avoid strenuous activity for the next 2 days    Do not drive a motor vehicle until tomorrow morning    Medicines:      You may resume all medications    For minor pain, you may take Acetaminophen (Tylenol) or Ibuprofen (Advil)            Call the doctor who ordered this test if:      Your headache becomes worse or is severe. (A minor headache is not unusual)    You have nausea or vomiting    You develop a stiff neck    The site is red, swollen, hot or tender    You have chills or a fever greater than 101 F (38 C)    Any questions or concerns      If you have questions call:        [facility] Radiology Dept @ [phone]      The provider who performed your procedure was _Dr. Colbert________________.

## 2018-11-08 NOTE — PROGRESS NOTES
Admitted to Care [facility] for scheduled lumbar puncture and myelogram. Denies any c/o. AVS reviewed and given to pt and wife. Plan of care explained and questions answered.

## 2018-11-09 LAB — COPATH REPORT: NORMAL

## 2018-11-11 LAB
ALB CSF/SERPL: 4.6 RATIO (ref 0–9)
ALBUMIN CSF-MCNC: 18 MG/DL (ref 0–35)
ALBUMIN SERPL-MCNC: 3900 MG/DL (ref 3500–5200)
IGG CSF-MCNC: 1.6 MG/DL (ref 0–6)
IGG SERPL-MCNC: 968 MG/DL (ref 768–1632)
IGG SYNTH RATE SER+CSF CALC-MRATE: <0 MG/D
IGG/ALB CLEAR SER+CSF-RTO: 0.36 RATIO (ref 0.28–0.66)
IGG/ALB CSF: 0.09 RATIO (ref 0.09–0.25)
OLIGOCLONAL BANDS CSF ELPH-IMP: NEGATIVE
OLIGOCLONAL BANDS CSF ELPH-IMP: NORMAL
OLIGOCLONAL BANDS CSF IEF: 0 BANDS (ref 0–1)

## 2018-11-13 LAB
BACTERIA SPEC CULT: NO GROWTH
SPECIMEN SOURCE: NORMAL

## 2018-11-29 ENCOUNTER — OFFICE VISIT (OUTPATIENT)
Dept: NEUROSURGERY | Facility: CLINIC | Age: 68
End: 2018-11-29
Payer: COMMERCIAL

## 2018-11-29 VITALS
DIASTOLIC BLOOD PRESSURE: 90 MMHG | OXYGEN SATURATION: 95 % | BODY MASS INDEX: 23.93 KG/M2 | HEART RATE: 60 BPM | SYSTOLIC BLOOD PRESSURE: 153 MMHG | WEIGHT: 174 LBS

## 2018-11-29 DIAGNOSIS — D43.2 NEOPLASM OF UNCERTAIN BEHAVIOR OF BRAIN AND SPINAL CORD (H): Primary | ICD-10-CM

## 2018-11-29 DIAGNOSIS — D43.4 NEOPLASM OF UNCERTAIN BEHAVIOR OF BRAIN AND SPINAL CORD (H): Primary | ICD-10-CM

## 2018-11-29 ASSESSMENT — PAIN SCALES - GENERAL: PAINLEVEL: NO PAIN (0)

## 2018-11-29 NOTE — PATIENT INSTRUCTIONS
Follow up with Dr. Boswell in 6 months with a MRI with and without contrast before the appointment.

## 2018-11-29 NOTE — LETTER
"11/29/2018       RE: Markel Baron  [address] [facility] [facility] [place] [address]     Dear Colleague,    Thank you for referring your patient, Markel Baron, to the [facility] NEUROSURGERY at [facility]. Please see a copy of my visit note below.    Neurosurgery Clinic Note      Chief Complaint: \"I have a tumor in my spinal cord\"      History of Present Illness:  It was a pleasure to evaluate Markel Baron in clinic today at the kind referral of Jagdish Valentine MD  [address] [place] [address].      Patient is here in followup after obtaining LP with CSF evaluation and CT myelogram      Lesion appears to have calcification adherent to dorsal spinal cord  CSF negative for malignant cells on cytology; culture negative; cell count/glucose/protein normal    No additional lesions on MRI brain/cervical/thoracic/lumbar spine   CT scan of shows calcification in part of thoracic mass lesion against dorsal thoracic cord   bladder ultrasound shows no significant residual post-void urine volume          We will get a CT myelogram to evaluate for CSF flow around mass lesion, and send CSF for cytology and routine cell count/protein/glucose/culture, also oligoclonal banding, IgG index.     Patient to return with above completed.        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      He was diagnosed with cervical dystonia two years ago. He sees [facility] for Botox injections. He has a left head-turn, never right. Sensory distraction with hand on chin/face seems to help decrease the dystonia.      Patient has no pain related to thoracic spine lesion, no radiating or radicular pain in arms/hands/chest. Has numbness in left great toe. Is not sure whether has bladder urgency changes, he will urinate 2-3 " times per night and every several hours during the day. He feels like his balance may be changing. He denies any weakness.      He had one episode of having loose stool incontinence that he did not realize, two months ago. This was an isolated incident.                  Review of Systems   Answers for HPI/ROS submitted by the patient   General Symptoms: No  Skin Symptoms: No  HENT Symptoms: No  EYE SYMPTOMS: No  HEART SYMPTOMS: No  LUNG SYMPTOMS: Yes  INTESTINAL SYMPTOMS: No  URINARY SYMPTOMS: No  REPRODUCTIVE SYMPTOMS: No  SKELETAL SYMPTOMS: Yes  BLOOD SYMPTOMS: No  NERVOUS SYSTEM SYMPTOMS: Yes  MENTAL HEALTH SYMPTOMS: No  Cough: No  Sputum or phlegm: No  Coughing up blood: No  Difficulty breating or shortness of breath: Yes  Snoring: No  Wheezing: No  Difficulty breathing on exertion: No  Nighttime Cough: No  Difficulty breathing when lying flat: No  Back pain: Yes  Muscle aches: Yes  Neck pain: Yes  Swollen joints: No  Joint pain: No  Bone pain: No  Muscle cramps: No  Muscle weakness: No  Joint stiffness: No  Bone fracture: No  Trouble with coordination: No  Dizziness or trouble with balance: Yes  Fainting or black-out spells: No  Memory loss: No  Headache: No  Seizures: No  Speech problems: No  Tingling: Yes  Tremor: No  Weakness: No  Difficulty walking: No  Paralysis: No  Numbness: No         Past Medical History- cervical dystonia        Past Surgical History              Past Surgical History:   Procedure Laterality Date     COLONOSCOPY    5 years ago     HERNIA REPAIR    17 years ago     ORTHOPEDIC SURGERY    shoulder 37 years ago, knee cartilage 30 years ago               Social History                 Social History     Marital status:          Spouse name: N/A     Number of children: N/A     Years of education: N/A                 Social History Main Topics     Smoking status: Former Smoker         Packs/day: 1.50         Years: 10.00         Types: Cigarettes, Dip, chew, snus or snuff         Quit  "date: 1985     Smokeless tobacco: Former User         Quit date: 1985     Alcohol use No     Drug use: No     Sexual activity: No                 Other Topics Concern     Parent/Sibling W/ Cabg, Mi Or Angioplasty Before 65f 55m? Yes         mother was 65 when she  of heart attack       Social History Narrative       Family History- no known dystonia     IMAGING per my own measurement and interpretation:  Xrays:standing long cassette 18      No scoliosis, normal sagittal alignment          MRI thoracic spine with contrast  at outside hospital:  T1 and T2 hyperintense intradural mass lesion upper thoracic spine, non-enhancing     CT myelogram 18: no change in mass size, no CSF flow obstruction, calcification adherent to dorsal spinal cord    IMPRESSION:  Well-circumscribed intramedullary fat-containing mass  within the posterior thoracic cord extending from T1-2 through T2-3.  10 x 14 x 46 mm (AP x TR x SI). 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  This results in moderate effacement of the CSF space without evidence  of CSF obstruction. No evidence of lesional rupture.           Resulted Imaging/Labs:  Bone Density:  No results found.      Vitamin D:  Vitamin D Deficiency Screening Results:  No results found for: VITDT  No results found for: RVL055, SFNP768, CXPP87NMDNJ, VITD3, D2VIT, D3VIT, DTOT, [id number], [id number], [id number], [id number], [id number], [id number]          Nutritional Status:  Estimated body mass index is 23.79 kg/(m^2) as calculated from the following:    Height as of this encounter: 1.816 m (5' 11.5\").    Weight as of this encounter: 78.5 kg (173 lb).      No results found for: ALBUMIN      Diabetes Screening:  No results found for: A1C      Nicotine Usage:      No but former                           Physical Exam   /90 (BP Location: Left arm, Patient Position: Chair, Cuff Size: Adult Regular)  Pulse " 60  Wt 78.9 kg (174 lb)  SpO2 95%  BMI 23.93 kg/m2    Constitutional: Oriented to person, place, and time. Appears well-developed and well-nourished. Cooperative. No distress.   HENT:   Head: Normocephalic and atraumatic.   Eyes: Conjunctivae are normal.  Neck: No spinous process tenderness and no muscular tenderness present. No tracheal deviation present.  Cardiovascular: Normal rate and regular rhythm.    Pulmonary/Chest: Effort normal and breath sounds normal.  Abdominal: Soft. Bowel sounds are normal. Exhibits no distension. There is no tenderness.   Musculoskeletal:   Cervical flexion-extension range of motion: leftward chin tilt and left torticollis  Lumbar flexion/extension range of motion: normal    Neurological: alert and oriented to person, place, and time.   No cranial nerve deficit   sensory deficit left great toe  Gait normal  Babinski downgoing toes      Reflex Scores:        Tricep reflexes are 2+ on the right side and 2+ on the left side.       Bicep reflexes are 2+ on the right side and 2+ on the left side.       Brachioradialis reflexes are 2+ on the right side and 2+ on the left side.       Patellar reflexes are 2+ on the right side and 2+ on the left side.       Achilles reflexes are 2+ on the right side and 2+ on the left side.      STRENGTH LEFT RIGHT   Deltoid 5 5   Bicep 5 5   Wrist Extensor 5 5   Tricep 5 5   Finger flexion 5 5   Finger abduction 5 5    5 5           Hip Flexion         5         5   Knee Extension 5 5   Ankle Dorsiflexion 5 5   Extensor Hallucis Longus 5 5   Plantar Flexion 5 5   Foot eversion 5 5   Foot inversion 5 5     No Lhermitte's, No Spurling's  No Danica's   No ankle clonus  Able to tandem walk with mild difficulty     Skin: Skin is warm, dry and intact.   Psychiatric: Normal mood and affect. Speech is normal and behavior is normal.              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 Surgical risk of resection would be significant due to intramedullary calcification of mass. If mass grows or becomes symptomatic, then we will treat.    - return to clinic in 6 months with MRI thoracic spine with and without contrast, or with imaging sooner if any new symptoms develop.    Isadora Boswell MD    [facility] Department of Neurosurgery  Office: [phone]    11/29/2018  12:15 PM      I performed independent visualization of radiographic imaging and entered my own interpretation, reviewed and/or ordered clinical laboratory tests and reviewed and/or ordered tests in radiology

## 2018-11-29 NOTE — MR AVS SNAPSHOT
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 Boswell in 6 months with a MRI with and without contrast before the appointment.          Follow-ups after your visit        Follow-up notes from your care team     Return in about 6 months (around 5/29/2019).      Future tests that were ordered for you today     Open Future Orders        Priority Expected Expires Ordered    MRI Thoracic spine with & without gadolinium [PUN076] Routine  11/29/2019 11/29/2018            Who to contact     Please call your clinic at [phone] to:    Ask questions about your health    Make or cancel appointments    Discuss your medicines    Learn about your test results    Speak to your doctor            Additional Information About Your Visit        [employer] Information     [employer] gives you secure access to your electronic health record. If you see a primary care provider, you can also send messages to your care team and make appointments. If you have questions, please call your primary care clinic.  If you do not have a primary care provider, please call [phone] and they will assist you.      [employer] is an electronic gateway that provides easy, online access to your medical records. With [employer], you can request a clinic appointment, read your test results, renew a prescription or communicate with your care team.     To access your existing account, please contact your [facility] or call [phone] for assistance.        Care EveryWhere ID     This is your Care EveryWhere ID. This could be used by other organizations to access  your [facility] medical records  [id number]        Your Vitals Were     Pulse Pulse Oximetry BMI (Body Mass Index)             60 95% 23.93 kg/m2          Blood Pressure from Last 3 Encounters:   11/29/18 153/90   11/08/18 125/73   09/27/18 138/88    Weight from Last 3 Encounters:   11/29/18 78.9 kg (174 lb)   09/27/18 79.4 kg (175 lb)   09/06/18 78.5 kg (173 lb)               Primary Care Provider Office Phone # Fax #    [facility] [phone]       [facility] [address] [address]        Equal Access to Services     NATHALIA NEVAREZ : Lizzie Simental, washoaib mayfield, diamond kaalmada dariel, catalina pritchett. So [facility] [phone].    ATENCIÓN: Si habla español, tiene a alegre disposición servicios gratuitos de asistencia lingüística. [facility] [place] [phone].    We comply with applicable federal civil rights laws and [place] laws. We do not discriminate on the basis of race, color, national origin, age, disability, sex, sexual orientation, or gender identity.            Thank you!     Thank you for choosing [facility]  for your care. Our goal is always to provide you with excellent care. Hearing back from our patients is one way we can continue to improve our services. Please take a few minutes to complete the written survey that you may receive in the mail after your visit with us. Thank you!             Your Updated Medication List - Protect others around you: Learn how to safely use, store and throw away your medicines at www.disposemymeds.org.          This list is accurate as of 11/29/18 12:44 PM.  Always use your most recent med list.                   Brand Name Dispense Instructions for use Diagnosis    LASHAY ASPIRIN EC LOW DOSE 81 MG EC tablet   Generic drug:  aspirin           BOTOX 100 units injection   Generic drug:  botulinum toxin type A     2 each    Inject 800 units over 1 year.  Inject 200 units intramuscularly  every 3 months    Cervical dystonia       budesonide-formoterol 80-4.5 MCG/ACT Inhaler    SYMBICORT     Inhale 2 puffs into the lungs daily        fish Oil 1200 MG capsule           PROAIR  (90 Base) MCG/ACT inhaler   Generic drug:  albuterol           simvastatin 40 MG tablet    ZOCOR     40 mg At Bedtime        SUPER B-COMPLEX Tabs           vitamin D3 1000 units Caps      2,000 Units daily

## 2018-11-29 NOTE — PROGRESS NOTES
"Neurosurgery Clinic Note      Chief Complaint: \"I have a tumor in my spinal cord\"      History of Present Illness:  It was a pleasure to evaluate Markel Baron in clinic today at the kind referral of Jagdish Valentine MD  [address] [place] [address].      Patient is here in followup after obtaining LP with CSF evaluation and CT myelogram      Lesion appears to have calcification adherent to dorsal spinal cord  CSF negative for malignant cells on cytology; culture negative; cell count/glucose/protein normal    No additional lesions on MRI brain/cervical/thoracic/lumbar spine   CT scan of shows calcification in part of thoracic mass lesion against dorsal thoracic cord   bladder ultrasound shows no significant residual post-void urine volume          We will get a CT myelogram to evaluate for CSF flow around mass lesion, and send CSF for cytology and routine cell count/protein/glucose/culture, also oligoclonal banding, IgG index.     Patient to return with above completed.        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      He was diagnosed with cervical dystonia two years ago. He sees [facility] for Botox injections. He has a left head-turn, never right. Sensory distraction with hand on chin/face seems to help decrease the dystonia.      Patient has no pain related to thoracic spine lesion, no radiating or radicular pain in arms/hands/chest. Has numbness in left great toe. Is not sure whether has bladder urgency changes, he will urinate 2-3 times per night and every several hours during the day. He feels like his balance may be changing. He denies any weakness.      He had one episode of having loose stool incontinence that he did not realize, two months ago. This was an isolated incident.                  Review of Systems "   Answers for HPI/ROS submitted by the patient   General Symptoms: No  Skin Symptoms: No  HENT Symptoms: No  EYE SYMPTOMS: No  HEART SYMPTOMS: No  LUNG SYMPTOMS: Yes  INTESTINAL SYMPTOMS: No  URINARY SYMPTOMS: No  REPRODUCTIVE SYMPTOMS: No  SKELETAL SYMPTOMS: Yes  BLOOD SYMPTOMS: No  NERVOUS SYSTEM SYMPTOMS: Yes  MENTAL HEALTH SYMPTOMS: No  Cough: No  Sputum or phlegm: No  Coughing up blood: No  Difficulty breating or shortness of breath: Yes  Snoring: No  Wheezing: No  Difficulty breathing on exertion: No  Nighttime Cough: No  Difficulty breathing when lying flat: No  Back pain: Yes  Muscle aches: Yes  Neck pain: Yes  Swollen joints: No  Joint pain: No  Bone pain: No  Muscle cramps: No  Muscle weakness: No  Joint stiffness: No  Bone fracture: No  Trouble with coordination: No  Dizziness or trouble with balance: Yes  Fainting or black-out spells: No  Memory loss: No  Headache: No  Seizures: No  Speech problems: No  Tingling: Yes  Tremor: No  Weakness: No  Difficulty walking: No  Paralysis: No  Numbness: No         Past Medical History- cervical dystonia        Past Surgical History              Past Surgical History:   Procedure Laterality Date     COLONOSCOPY    5 years ago     HERNIA REPAIR    17 years ago     ORTHOPEDIC SURGERY    shoulder 37 years ago, knee cartilage 30 years ago               Social History                 Social History     Marital status:          Spouse name: N/A     Number of children: N/A     Years of education: N/A                 Social History Main Topics     Smoking status: Former Smoker         Packs/day: 1.50         Years: 10.00         Types: Cigarettes, Dip, chew, snus or snuff         Quit date: 1985     Smokeless tobacco: Former User         Quit date: 1985     Alcohol use No     Drug use: No     Sexual activity: No                 Other Topics Concern     Parent/Sibling W/ Cabg, Mi Or Angioplasty Before 65f 55m? Yes         mother was 65 when she  of heart  "attack       Social History Narrative       Family History- no known dystonia     IMAGING per my own measurement and interpretation:  Xrays:standing long cassette 09/06/18      No scoliosis, normal sagittal alignment          MRI thoracic spine with contrast 5/18 at outside hospital:  T1 and T2 hyperintense intradural mass lesion upper thoracic spine, non-enhancing     CT myelogram 11/8/18: no change in mass size, no CSF flow obstruction, calcification adherent to dorsal spinal cord    IMPRESSION:  Well-circumscribed intramedullary fat-containing mass  within the posterior thoracic cord extending from T1-2 through T2-3.  10 x 14 x 46 mm (AP x TR x SI). 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  This results in moderate effacement of the CSF space without evidence  of CSF obstruction. No evidence of lesional rupture.           Resulted Imaging/Labs:  Bone Density:  No results found.      Vitamin D:  Vitamin D Deficiency Screening Results:  No results found for: VITDT  No results found for: PVO260, ZOBE865, LCMZ36LBIFK, VITD3, D2VIT, D3VIT, DTOT, [id number], [id number], [id number], [id number], [id number], [id number]          Nutritional Status:  Estimated body mass index is 23.79 kg/(m^2) as calculated from the following:    Height as of this encounter: 1.816 m (5' 11.5\").    Weight as of this encounter: 78.5 kg (173 lb).      No results found for: ALBUMIN      Diabetes Screening:  No results found for: A1C      Nicotine Usage:      No but former                           Physical Exam   /90 (BP Location: Left arm, Patient Position: Chair, Cuff Size: Adult Regular)  Pulse 60  Wt 78.9 kg (174 lb)  SpO2 95%  BMI 23.93 kg/m2    Constitutional: Oriented to person, place, and time. Appears well-developed and well-nourished. Cooperative. No distress.   HENT:   Head: Normocephalic and atraumatic.   Eyes: Conjunctivae are normal.  Neck: No spinous process " tenderness and no muscular tenderness present. No tracheal deviation present.  Cardiovascular: Normal rate and regular rhythm.    Pulmonary/Chest: Effort normal and breath sounds normal.  Abdominal: Soft. Bowel sounds are normal. Exhibits no distension. There is no tenderness.   Musculoskeletal:   Cervical flexion-extension range of motion: leftward chin tilt and left torticollis  Lumbar flexion/extension range of motion: normal    Neurological: alert and oriented to person, place, and time.   No cranial nerve deficit   sensory deficit left great toe  Gait normal  Babinski downgoing toes      Reflex Scores:        Tricep reflexes are 2+ on the right side and 2+ on the left side.       Bicep reflexes are 2+ on the right side and 2+ on the left side.       Brachioradialis reflexes are 2+ on the right side and 2+ on the left side.       Patellar reflexes are 2+ on the right side and 2+ on the left side.       Achilles reflexes are 2+ on the right side and 2+ on the left side.      STRENGTH LEFT RIGHT   Deltoid 5 5   Bicep 5 5   Wrist Extensor 5 5   Tricep 5 5   Finger flexion 5 5   Finger abduction 5 5    5 5           Hip Flexion         5         5   Knee Extension 5 5   Ankle Dorsiflexion 5 5   Extensor Hallucis Longus 5 5   Plantar Flexion 5 5   Foot eversion 5 5   Foot inversion 5 5     No Lhermitte's, No Spurling's  No Danica's   No ankle clonus  Able to tandem walk with mild difficulty     Skin: Skin is warm, dry and intact.   Psychiatric: Normal mood and affect. Speech is normal and behavior is normal.              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 This is likely  either an intramedullary dermoid or lipoma with calcification. Surgical risk of resection would be significant due to intramedullary calcification of mass. If mass grows or becomes symptomatic, then we will treat.    - return to clinic in 6 months with MRI thoracic spine with and without contrast, or with imaging sooner if any new symptoms develop.    Isadora Boswell MD    [facility] Department of Neurosurgery  Office: [phone]    11/29/2018  12:15 PM      I performed independent visualization of radiographic imaging and entered my own interpretation, reviewed and/or ordered clinical laboratory tests and reviewed and/or ordered tests in radiology

## 2019-03-04 ENCOUNTER — TELEPHONE (OUTPATIENT)
Dept: NEUROLOGY | Facility: CLINIC | Age: 69
End: 2019-03-04

## 2019-03-04 NOTE — TELEPHONE ENCOUNTER
[facility] Call Center    Phone Message    May a detailed message be left on voicemail: yes    Reason for Call: Other: Markel wanted to verify if Dr. Valentine had received prior auth for his botox procedure in clinic.  Markel now has [employer] -[employer] - throught his wife's employer.  Pre certification line for BSBS is phone number [phone].  or call customer service [phone]     Action Taken: Message routed to:  [facility] ([facility]): [facility] neurology

## 2019-03-04 NOTE — TELEPHONE ENCOUNTER
botulinum toxin type A (BOTOX) 100 UNITS injection 2 each 3 2/9/2018  --   Sig: Inject 800 units over 1 year.  Inject 200 units intramuscularly every 3 months     Prior Authorization Infusion/Clinic Administered Request    Location: [facility] [address] [place] [address], [address]  Diagnosis and ICD: Cervical dystonia G24.3  Drug/Therapy: See above    Previously Tried and Failed Therapies: N/A    Date of provider note with supporting information: 4/17/2018    Urgency (When is the patient scheduled?): 02.9 Other Brain    Would you like to include any research articles? No  This is the standard of care for patients with cervical dystonia.

## 2019-03-07 NOTE — TELEPHONE ENCOUNTER
Called Markel and informed him it will take up to 14 days to hear back from his insurance to verify if he is approved or not. I informed Markel I will call him once I hear if he is approved or not.    Markel stated he is unsure if he is going to get the injections yet. He is trying to manage his cervical dystonia with exercise and would like Botox as a back up plan.

## 2019-04-03 NOTE — TELEPHONE ENCOUNTER
Informed Markel his Botox was approved. Markel stated he was notified that he was authorized by insurance for 1 unit. He contacted his insurance and they verified he was authorized for 1 unit of Botox.    Recommendation  1. I informed Markel I will follow up with our prior authorization team on the amount he is approved for.

## 2019-04-05 NOTE — TELEPHONE ENCOUNTER
Called Markel and informed him that our prior authorization specialist, Rachel Gomez, looked into it and found that he is approved for 200 Units of Botox every 3 month.

## 2019-05-22 ASSESSMENT — ENCOUNTER SYMPTOMS
ARTHRALGIAS: 0
NUMBNESS: 0
BACK PAIN: 0
DIZZINESS: 1
MEMORY LOSS: 0
MUSCLE CRAMPS: 0
SPEECH CHANGE: 0
DISTURBANCES IN COORDINATION: 0
PARALYSIS: 0
TREMORS: 0
MUSCLE WEAKNESS: 0
SEIZURES: 0
HEADACHES: 0
MYALGIAS: 1
STIFFNESS: 0
TINGLING: 1
NECK PAIN: 1
LOSS OF CONSCIOUSNESS: 0
JOINT SWELLING: 0
WEAKNESS: 0

## 2019-05-23 ENCOUNTER — OFFICE VISIT (OUTPATIENT)
Dept: NEUROSURGERY | Facility: CLINIC | Age: 69
End: 2019-05-23
Payer: COMMERCIAL

## 2019-05-23 ENCOUNTER — ANCILLARY PROCEDURE (OUTPATIENT)
Dept: MRI IMAGING | Facility: CLINIC | Age: 69
End: 2019-05-23
Attending: NEUROLOGICAL SURGERY
Payer: COMMERCIAL

## 2019-05-23 VITALS
HEART RATE: 64 BPM | TEMPERATURE: 97.4 F | BODY MASS INDEX: 23.77 KG/M2 | DIASTOLIC BLOOD PRESSURE: 86 MMHG | SYSTOLIC BLOOD PRESSURE: 161 MMHG | OXYGEN SATURATION: 99 % | HEIGHT: 72 IN | WEIGHT: 175.5 LBS

## 2019-05-23 DIAGNOSIS — D43.2 NEOPLASM OF UNCERTAIN BEHAVIOR OF BRAIN AND SPINAL CORD (H): ICD-10-CM

## 2019-05-23 DIAGNOSIS — D43.4 NEOPLASM OF UNCERTAIN BEHAVIOR OF BRAIN AND SPINAL CORD (H): ICD-10-CM

## 2019-05-23 DIAGNOSIS — D43.2 NEOPLASM OF UNCERTAIN BEHAVIOR OF BRAIN AND SPINAL CORD (H): Primary | ICD-10-CM

## 2019-05-23 DIAGNOSIS — D43.4 NEOPLASM OF UNCERTAIN BEHAVIOR OF BRAIN AND SPINAL CORD (H): Primary | ICD-10-CM

## 2019-05-23 RX ORDER — GADOBUTROL 604.72 MG/ML
7.5 INJECTION INTRAVENOUS ONCE
Status: COMPLETED | OUTPATIENT
Start: 2019-05-23 | End: 2019-05-23

## 2019-05-23 RX ADMIN — GADOBUTROL 7.5 ML: 604.72 INJECTION INTRAVENOUS at 09:03

## 2019-05-23 ASSESSMENT — MIFFLIN-ST. JEOR: SCORE: 1606.12

## 2019-05-23 ASSESSMENT — PAIN SCALES - GENERAL: PAINLEVEL: NO PAIN (0)

## 2019-05-23 NOTE — DISCHARGE INSTRUCTIONS
MRI Contrast Discharge Instructions    The IV contrast you received today will pass out of your body in your  urine. This will happen in the next 24 hours. You will not feel this process.  Your urine will not change color.    Drink at least 4 extra glasses of water or juice today (unless your doctor  has restricted your fluids). This reduces the stress on your kidneys.  You may take your regular medicines.    If you are on dialysis: It is best to have dialysis today.    If you have a reaction: Most reactions happen right away. If you have  any new symptoms after leaving the hospital (such as hives or swelling),  call your hospital at the correct number below. Or call your family doctor.  If you have breathing distress or wheezing, call 911.    Special instructions: ***    I have read and understand the above information.    Signature:______________________________________ Date:___________    Staff:__________________________________________ Date:___________     Time:__________    [place] [facility] Departments:    ___[place]: [phone]  ___[facility]: [phone]  ___[place]: [phone] ___[facility]: [phone]  ___[facility]: [phone]  ___[facility]: [phone]  ___[phone]  ___[facility]: [phone]  ___[phone]

## 2019-05-23 NOTE — LETTER
5/23/2019       RE: Markel Baron  [address] [facility] [address]     Dear Colleague,    Thank you for referring your patient, Markel Baron, to the [facility] NEUROSURGERY at [facility]. Please see a copy of my visit note below.    Neurosurgery Clinic Note      Chief Complaint: followup spinal cord tumor      History of Present Illness:  It was a pleasure to evaluate Markel Baron in clinic today at the kind referral of Jagdish Valentine MD  [address] [place] [address].      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      He was diagnosed with cervical dystonia two years ago. He sees [facility] for Botox injections. He has a left head-turn, never right. Sensory distraction with hand on chin/face seems to help decrease the dystonia.      Patient has no pain related to thoracic spine lesion, no radiating or radicular pain in arms/hands/chest. Has numbness in left great toe. Is not sure whether has bladder urgency changes, he will urinate 2-3 times per night and every several hours during the day. He feels like his balance may be changing. He denies any weakness.      He had one episode of having loose stool incontinence that he did not realize. This was an isolated incident last year, has not recurred        He has had MRI, showing no additional lesions on MRI brain/cervical/thoracic/lumbar spine   He has had an LP 11/8/18 with negative cytology; cell count/glucose/protein normal  He has had a CT myelogram demonstrating intramedullary calcification of mass lesion   bladder ultrasound shows no significant residual post-void urine  volume        Review of Systems   Answers for HPI/ROS submitted by the patient on 2019       Past Medical History- cervical dystonia        Past Surgical History              Past Surgical History:   Procedure Laterality Date     COLONOSCOPY    5 years ago     HERNIA REPAIR    17 years ago     ORTHOPEDIC SURGERY    shoulder 37 years ago, knee cartilage 30 years ago               Social History                 Social History     Marital status:          Spouse name: N/A     Number of children: N/A     Years of education: N/A                 Social History Main Topics     Smoking status: Former Smoker         Packs/day: 1.50         Years: 10.00         Types: Cigarettes, Dip, chew, snus or snuff         Quit date: 1985     Smokeless tobacco: Former User         Quit date: 1985     Alcohol use No     Drug use: No     Sexual activity: No                 Other Topics Concern     Parent/Sibling W/ Cabg, Mi Or Angioplasty Before 65f 55m? Yes         mother was 65 when she  of heart attack       Social History Narrative       Family History- no known dystonia     IMAGING per my own measurement and interpretation:  Xrays:standing long cassette 18      No scoliosis, normal sagittal alignment          MRI thoracic spine with contrast  at outside hospital:  T1 and T2 hyperintense intradural mass lesion upper thoracic spine, non-enhancing     CT myelogram 18: no change in mass size, no CSF flow obstruction, calcification adherent to dorsal spinal cord     IMPRESSION:  Well-circumscribed intramedullary fat-containing mass  within the posterior thoracic cord extending from T1-2 through T2-3.  10 x 14 x 46 mm (AP x TR x SI). 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  This results in moderate effacement of the CSF space without evidence  of CSF obstruction. No evidence of lesional rupture.      MRI thoracic spine 19 shows no  "change in lesion size      Resulted Imaging/Labs:  Bone Density:  No results found.      Vitamin D:  Vitamin D Deficiency Screening Results:  No results found for: VITDT  No results found for: ANL502, LZBC145, [id number], VITD3, D2VIT, D3VIT, DTOT, [id number], [id number], [id number], [id number], [id number], [id number]          Nutritional Status:  Estimated body mass index is 23.79 kg/(m^2) as calculated from the following:    Height as of this encounter: 1.816 m (5' 11.5\").    Weight as of this encounter: 78.5 kg (173 lb).      No results found for: ALBUMIN      Diabetes Screening:  No results found for: A1C      Nicotine Usage:      No but former      Physical Exam   /86 (BP Location: Left arm, Patient Position: Sitting, Cuff Size: Adult Regular)   Pulse 64   Temp 97.4  F (36.3  C) (Oral)   Ht 1.816 m (5' 11.5\")   Wt 79.6 kg (175 lb 8 oz)   SpO2 99%   BMI 24.14 kg/m       Constitutional: Oriented to person, place, and time. Appears well-developed and well-nourished. Cooperative. No distress.   HENT:   Head: Normocephalic and atraumatic.   Eyes: Conjunctivae are normal.  Neck: No spinous process tenderness and no muscular tenderness present. No tracheal deviation present.  Cardiovascular: Normal rate and regular rhythm.    Pulmonary/Chest: Effort normal and breath sounds normal.  Abdominal: Soft. Bowel sounds are normal. Exhibits no distension. There is no tenderness.   Musculoskeletal:   Cervical flexion-extension range of motion: leftward chin tilt and left torticollis  Lumbar flexion/extension range of motion: normal    Neurological: alert and oriented to person, place, and time.   No cranial nerve deficit   sensory deficit left great toe  Gait normal  Babinski downgoing toes      Reflex Scores:        Tricep reflexes are 2+ on the right side and 2+ on the left side.       Bicep reflexes are 2+ on the right side and 2+ on the left side.       Brachioradialis reflexes are 2+ on the right side and " 2+ on the left side.       Patellar reflexes are 2+ on the right side and 2+ on the left side.       Achilles reflexes are 2+ on the right side and 2+ on the left side.      STRENGTH LEFT RIGHT   Deltoid 5 5   Bicep 5 5   Wrist Extensor 5 5   Tricep 5 5   Finger flexion 5 5   Finger abduction 5 5    5 5           Hip Flexion         5         5   Knee Extension 5 5   Ankle Dorsiflexion 5 5   Extensor Hallucis Longus 5 5   Plantar Flexion 5 5   Foot eversion 5 5   Foot inversion 5 5     No Lhermitte's, No Spurling's  No Danica's   No ankle clonus  Able to tandem walk with mild difficulty     Skin: Skin is warm, dry and intact.   Psychiatric: Normal mood and affect. Speech is normal and behavior is normal.      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 Surgical risk of resection would be significant due to intramedullary calcification of mass. If mass grows or becomes symptomatic, then we will treat.     - return to clinic in 12 months with MRI thoracic spine with and without contrast, or with imaging sooner if any new symptoms develop.     Isadora Boswell MD    [facility] Department of Neurosurgery  Office: [phone]

## 2019-05-23 NOTE — PROGRESS NOTES
Neurosurgery Clinic Note      Chief Complaint: followup spinal cord tumor      History of Present Illness:  It was a pleasure to evaluate Markel Baron in clinic today at the kind referral of Jagdish Valentine MD  [address] [place] [address].      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      He was diagnosed with cervical dystonia two years ago. He sees [facility] for Botox injections. He has a left head-turn, never right. Sensory distraction with hand on chin/face seems to help decrease the dystonia.      Patient has no pain related to thoracic spine lesion, no radiating or radicular pain in arms/hands/chest. Has numbness in left great toe. Is not sure whether has bladder urgency changes, he will urinate 2-3 times per night and every several hours during the day. He feels like his balance may be changing. He denies any weakness.      He had one episode of having loose stool incontinence that he did not realize. This was an isolated incident last year, has not recurred        He has had MRI, showing no additional lesions on MRI brain/cervical/thoracic/lumbar spine   He has had an LP 11/8/18 with negative cytology; cell count/glucose/protein normal  He has had a CT myelogram demonstrating intramedullary calcification of mass lesion   bladder ultrasound shows no significant residual post-void urine volume                           Review of Systems   Answers for HPI/ROS submitted by the patient on 5/22/2019   General Symptoms: No  Skin Symptoms: No  HENT Symptoms: No  EYE SYMPTOMS: No  HEART SYMPTOMS: No  LUNG SYMPTOMS: No  INTESTINAL SYMPTOMS: No  URINARY SYMPTOMS:  No  REPRODUCTIVE SYMPTOMS: No  SKELETAL SYMPTOMS: Yes  BLOOD SYMPTOMS: No  NERVOUS SYSTEM SYMPTOMS: Yes  MENTAL HEALTH SYMPTOMS: No  Back pain: No  Muscle aches: Yes  Neck pain: Yes  Swollen joints: No  Joint pain: No  Bone pain: No  Muscle cramps: No  Muscle weakness: No  Joint stiffness: No  Bone fracture: No  Trouble with coordination: No  Dizziness or trouble with balance: Yes  Fainting or black-out spells: No  Memory loss: No  Headache: No  Seizures: No  Speech problems: No  Tingling: Yes  Tremor: No  Weakness: No  Difficulty walking: No  Paralysis: No  Numbness: No      Past Medical History- cervical dystonia        Past Surgical History              Past Surgical History:   Procedure Laterality Date     COLONOSCOPY    5 years ago     HERNIA REPAIR    17 years ago     ORTHOPEDIC SURGERY    shoulder 37 years ago, knee cartilage 30 years ago               Social History                 Social History     Marital status:          Spouse name: N/A     Number of children: N/A     Years of education: N/A                 Social History Main Topics     Smoking status: Former Smoker         Packs/day: 1.50         Years: 10.00         Types: Cigarettes, Dip, chew, snus or snuff         Quit date: 1985     Smokeless tobacco: Former User         Quit date: 1985     Alcohol use No     Drug use: No     Sexual activity: No                 Other Topics Concern     Parent/Sibling W/ Cabg, Mi Or Angioplasty Before 65f 55m? Yes         mother was 65 when she  of heart attack       Social History Narrative       Family History- no known dystonia     IMAGING per my own measurement and interpretation:  Xrays:standing long cassette 18      No scoliosis, normal sagittal alignment          MRI thoracic spine with contrast  at outside hospital:  T1 and T2 hyperintense intradural mass lesion upper thoracic spine, non-enhancing     CT myelogram 18: no change in mass size, no CSF flow obstruction,  "calcification adherent to dorsal spinal cord     IMPRESSION:  Well-circumscribed intramedullary fat-containing mass  within the posterior thoracic cord extending from T1-2 through T2-3.  10 x 14 x 46 mm (AP x TR x SI). 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  This results in moderate effacement of the CSF space without evidence  of CSF obstruction. No evidence of lesional rupture.      MRI thoracic spine 05/23/19 shows no change in lesion size           Resulted Imaging/Labs:  Bone Density:  No results found.      Vitamin D:  Vitamin D Deficiency Screening Results:  No results found for: VITDT  No results found for: CSV931, YHHA632, DWXY37XPCRM, VITD3, D2VIT, D3VIT, DTOT, [id number], [id number], [id number], [id number], [id number], [id number]          Nutritional Status:  Estimated body mass index is 23.79 kg/(m^2) as calculated from the following:    Height as of this encounter: 1.816 m (5' 11.5\").    Weight as of this encounter: 78.5 kg (173 lb).      No results found for: ALBUMIN      Diabetes Screening:  No results found for: A1C      Nicotine Usage:      No but former                           Physical Exam   /86 (BP Location: Left arm, Patient Position: Sitting, Cuff Size: Adult Regular)   Pulse 64   Temp 97.4  F (36.3  C) (Oral)   Ht 1.816 m (5' 11.5\")   Wt 79.6 kg (175 lb 8 oz)   SpO2 99%   BMI 24.14 kg/m         Constitutional: Oriented to person, place, and time. Appears well-developed and well-nourished. Cooperative. No distress.   HENT:   Head: Normocephalic and atraumatic.   Eyes: Conjunctivae are normal.  Neck: No spinous process tenderness and no muscular tenderness present. No tracheal deviation present.  Cardiovascular: Normal rate and regular rhythm.    Pulmonary/Chest: Effort normal and breath sounds normal.  Abdominal: Soft. Bowel sounds are normal. Exhibits no distension. There is no tenderness.   Musculoskeletal:   Cervical " flexion-extension range of motion: leftward chin tilt and left torticollis  Lumbar flexion/extension range of motion: normal    Neurological: alert and oriented to person, place, and time.   No cranial nerve deficit   sensory deficit left great toe  Gait normal  Babinski downgoing toes      Reflex Scores:        Tricep reflexes are 2+ on the right side and 2+ on the left side.       Bicep reflexes are 2+ on the right side and 2+ on the left side.       Brachioradialis reflexes are 2+ on the right side and 2+ on the left side.       Patellar reflexes are 2+ on the right side and 2+ on the left side.       Achilles reflexes are 2+ on the right side and 2+ on the left side.      STRENGTH LEFT RIGHT   Deltoid 5 5   Bicep 5 5   Wrist Extensor 5 5   Tricep 5 5   Finger flexion 5 5   Finger abduction 5 5    5 5           Hip Flexion         5         5   Knee Extension 5 5   Ankle Dorsiflexion 5 5   Extensor Hallucis Longus 5 5   Plantar Flexion 5 5   Foot eversion 5 5   Foot inversion 5 5     No Lhermitte's, No Spurling's  No Danica's   No ankle clonus  Able to tandem walk with mild difficulty     Skin: Skin is warm, dry and intact.   Psychiatric: Normal mood and affect. Speech is normal and behavior is normal.              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 Surgical risk of resection would be significant due to intramedullary calcification of mass. If mass grows or becomes symptomatic, then we will treat.     - return to clinic in 12 months with MRI thoracic spine with and without contrast, or with  imaging sooner if any new symptoms develop.     Isadora Boswell MD    [facility] Department of Neurosurgery  Office: [phone]

## 2019-09-29 ENCOUNTER — HEALTH MAINTENANCE LETTER (OUTPATIENT)
Age: 69
End: 2019-09-29

## 2019-11-05 NOTE — NURSING NOTE
Chief Complaint   Patient presents with     Consult     [facility] NEW TUMOR IN SPINAL CANAL/ XRAYS PRIOR, MRI IN PACS THOR/SPINE     Hakeem Mendiola, EMT     none

## 2020-01-03 ENCOUNTER — TELEPHONE (OUTPATIENT)
Dept: NEUROLOGY | Facility: CLINIC | Age: 70
End: 2020-01-03

## 2020-03-15 ENCOUNTER — HEALTH MAINTENANCE LETTER (OUTPATIENT)
Age: 70
End: 2020-03-15

## 2020-05-11 ENCOUNTER — MYC MEDICAL ADVICE (OUTPATIENT)
Dept: NEUROSURGERY | Facility: CLINIC | Age: 70
End: 2020-05-11

## 2020-06-05 NOTE — TELEPHONE ENCOUNTER
[facility] Call Center    Phone Message    May a detailed message be left on voicemail: yes     Reason for Call: Patient returning call from message left by Antolin in clinic. His message for Antolin is that he does not have any new symptoms at this time. If there are any additional questions, feel free to give patient call back.    Action Taken: Message routed to:  [facility] ([facility] Neurosurgery    Travel Screening: Not Applicable

## 2020-06-05 NOTE — TELEPHONE ENCOUNTER
Writer left message responding to the pt's [employer] message.  Pt would like to delay his f/u with MD up to a year due to covid-19.  Writer left a message regarding new or worsening symptoms.

## 2021-01-14 ENCOUNTER — HEALTH MAINTENANCE LETTER (OUTPATIENT)
Age: 71
End: 2021-01-14

## 2021-03-04 NOTE — TELEPHONE ENCOUNTER
RECORDS RECEIVED FROM: Self   Date of Appt: 3/9/21   NOTES (FOR ALL VISITS) STATUS DETAILS   OFFICE NOTE from referring provider N/A    OFFICE NOTE from other specialist Internal Dr Valentine @ [facility] Neurology:  4/17/18   DISCHARGE SUMMARY from hospital N/A    DISCHARGE REPORT from the ER N/A    OPERATIVE REPORT N/A    MEDICATION LIST Internal    IMAGING  (FOR ALL VISITS)     EMG N/A    EEG N/A    LUMBAR PUNCTURE N/A    DENA SCAN N/A    ULTRASOUND (CAROTID BILAT) *VASCULAR* N/A    MRI (HEAD, NECK, SPINE) Internal [facility]:  MRI Thoracic Spine 5/23/19  MRI Brain 9/26/18  MRI Cervical Spine 9/26/18  MRI Thoracic Spine 9/26/18  MRI Lumbar Spine 9/26/18   CT (HEAD, NECK, SPINE) Internal  [place]:  CT Cervical Spine 11/8/18  CT Thoracic Spine 11/8/18

## 2021-03-09 ENCOUNTER — PRE VISIT (OUTPATIENT)
Dept: NEUROLOGY | Facility: CLINIC | Age: 71
End: 2021-03-09

## 2021-03-10 NOTE — PROGRESS NOTES
"Video-Visit Details  The patient has been notified of following:     \"After a review of the patient s situation, this visit was changed from an in-person visit to a video visit to reduce the risk of COVID-19 exposure.   The patient is being evaluated via a billable video visit.\"    \"This video visit will be conducted via a call between you and your physician/provider. We have found that certain health care needs can be provided without the need for an in-person physical exam.  This service lets us provide the care you need with a video conversation.  If a prescription is necessary we can send it directly to your pharmacy.  If lab work is needed we can place an order for that and you can then stop by our lab to have the test done at a later time.    If during the course of the call the physician/provider feels a video visit is not appropriate, you will not be charged for this service.\"     Patient has given verbal consent for Video visit? Yes    Patient would like the video invitation sent by: [employer]    Type of service:  Video Visit    Video Start Time: 8:00am    Video End Time (time video stopped): 8:56 AM    Duration: 56min    Originating Location (pt. Location): Home    Distant Location (provider location):  [facility] [place]     Mode of Communication:  Video Conference via [employer]  -----------------------------------------------------------------------------------------------------------------------  Department of Neurology  Movement Disorders Division   New Patient Visit    Patient: Markel Baron   MRN: [id number]   : 1950   Date of Visit: 3/12/2021    CC: cervical dystonia    HPI:  Mr. Baron is a 69 yo man with cervical dystonia who returns for follow-up after two years.  Symptoms started 5 years ago and gradually worsened.  Began with trouble moving his head from side to side.  His head pulls to the left.  He was diagnosed with cervical dystonia in 2016.  Over the " past year he has had a lot of stress and his symptoms have escalated.  It can also be worsened by strenuous exercise or lack of activity.  He tries meditation to calm himself and control his symptoms.  He has a sensory trick of touching his left cheek.  He has pain in his right neck and shoulder but can be on the left side of the neck as well.  At times there is a subtle head tremor.    Sometimes when he writes his handwriting his shaky.  He doesn't otherwise notice a tremor.  Denies dystonia symptoms in his trunk or legs.  Does have a herniated disc at L3 in his back that causes right foot tingling.  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  This was asymptomatic so he did not have surgery.  This has remained asymptomatic.    He has a history of selective serotonin reuptake inhibitor use.  He feels these worsen his dystonia. No history of antiemetic use.      He is wondering if he should have another round of botox injections.  He didn't think that it was beneficial initially but now things perhaps he did see some benefit in his head turning.    Now he is having trouble sleeping, sleep onset insomnia and frequent awakenings.  This has been worse in the past year.  He isn't sure if it is from the dystonia or anxiety. He notices that it is difficult to shut his mind off. He thinks his symptoms are under control with exercise and stretching.    He thinks he may have had anxiety for most of his adult life but previously self medicated with alcohol and recreation drugs.  These substances increase the dystonia who he has quit for the past few years.    He was last seen by Dr. Valentine on 4/17/2018 for Botox injections.  He previously received botulinum toxin injections at the [facility] in 2017.      ROS:  All others negative except as listed above.    Past Medical History:   Diagnosis Date     Cervical dystonia      Lipoma of spinal cord      Uncomplicated asthma         Past Surgical History:   Procedure  Laterality Date     COLONOSCOPY  5 years ago     HERNIA REPAIR  17 years ago     ORTHOPEDIC SURGERY  shoulder 37 years ago, knee cartilage 30 years ago    No back or neck surgery.  Right shoulder surgery.    Current Outpatient Medications   Medication Sig Dispense Refill     albuterol (PROAIR HFA) 108 (90 Base) MCG/ACT Inhaler as needed        budesonide-formoterol (SYMBICORT) 80-4.5 MCG/ACT Inhaler Inhale 2 puffs into the lungs daily       Cholecalciferol (VITAMIN D3) 1000 units CAPS 2,000 Units daily        Omega-3 Fatty Acids (FISH OIL) 1200 MG capsule        simvastatin (ZOCOR) 40 MG tablet 40 mg At Bedtime        triamcinolone (KENALOG) 0.1 % external cream Apply 1 Application topically 2 times daily         Allergies   Allergen Reactions     Cats      Dust Mites      Mold      Ragweeds Visual Disturbance        No family history on file. Aunt had a learning disability and he remembers that her head is always turned.    Social History     Socioeconomic History     Marital status:      Spouse name: Not on file     Number of children: Not on file     Years of education: Not on file     Highest education level: Not on file   Occupational History     Not on file   Social Needs     Financial resource strain: Not on file     Food insecurity     Worry: Not on file     Inability: Not on file     Transportation needs     Medical: Not on file     Non-medical: Not on file   Tobacco Use     Smoking status: Former Smoker     Packs/day: 1.50     Years: 10.00     Pack years: 15.00     Types: Cigarettes, Dip, chew, snus or snuff     Quit date: 1985     Years since quittin.2     Smokeless tobacco: Former User     Quit date: 1985   Substance and Sexual Activity     Alcohol use: Yes     Comment: rare     Drug use: No     Sexual activity: Never   Lifestyle     Physical activity     Days per week: Not on file     Minutes per session: Not on file     Stress: Not on file   Relationships     Social connections      Talks on phone: Not on file     Gets together: Not on file     Attends Confucianism service: Not on file     Active member of club or organization: Not on file     Attends meetings of clubs or organizations: Not on file     Relationship status: Not on file     Intimate partner violence     Fear of current or ex partner: Not on file     Emotionally abused: Not on file     Physically abused: Not on file     Forced sexual activity: Not on file   Other Topics Concern     Parent/sibling w/ CABG, MI or angioplasty before 65F 55M? Yes     Comment: mother was 65 when she  of heart attack   Social History Narrative     Not on file    Retired prior to his dystonia symptoms.      PHYSICAL EXAM:  There were no vitals taken for this visit.    Gen: alert, active, attentive, appropriately groomed   HEENT: normocephalic  Chest: normal wob on ra  Psych: mood stable     NEURO:  MS: Alert.  Speech normal to comprehension and naming.  Recent and remote memory intact.  Attention and concentration normal.  Fund of knowledge normal.    CN:  IX, X: No dysarthria.    Motor:  No pronator drift.    [place] Spasmodic Torticollis Rating Scale (TWSTRS):  I. Torticollis Severity Scale (Max = 35)  ROTATION TURN: right or left  Left 4   ANTEROCOLLIS  OR 0   RETROCOLLIS  2   LATEROCOLLIS Tilt: right or left Right 2   LATERAL SHIFT right or left 0   SAGITTAL SHIFT forward of backward 0   DURATION FACTOR (weighted x 2) SCORE:   5 x2 = 10   EFFECT OF SENSORY TRICKS  0   SHOULDER ELEVATION/ ANTERIOR DISPLACEMENT right or left Left anterior  2   RANGE OF MOTION (without sensory tricks) 0   TIME (without sensory tricks) 4     TOTAL SCORE: 24     II. Disability Scale (Max = 30)  WORK 2   ACTIVITIES OF DAILY LIVING 2   DRIVING 2   READING 2   TELEVISION 2   ACTIVITIES OUTSIDE THE HOME 1   EMBARRASSMENT RELATED TO ABOVE 1   TOTAL SCORE: 33     III. Pain Scale (Max = 20)   Best Worst Average   NECK PAIN (within past week) 1 6 5     PAIN SEVERITY:  [(2 x average) + worst + best]                                                 4       4.25   DURATION 5   PAIN 2   TOTAL SCORE 11.25     Scale Score   Severity Scale 24   Disability Scale 12   Pain Scale 11.25   Total Score (Max = 85)  47.25         ASSESSMENT/PLAN:  Mr. Baron is a 69 yo man with cervical dystonia who returns for follow-up.  He was agreeable to trying botox injections again.  Will likely need an increased dose.    - Follow-up with neurosurgery for spinal lipoma  - Resume Botox injections, obtain approval for 400 units with Dr. Duval  - Sleep psychology referral for insomnia      Nita Arreaga MD  Movement Disorders Fellow    Patient seen and examined, as much as we could via video, with Dr. Arreaga and I agree with the assessment and plan as outlined.  I participated throughout this TeleHealth visit.    Willian Alexander PhD, MD

## 2021-03-12 ENCOUNTER — VIRTUAL VISIT (OUTPATIENT)
Dept: NEUROLOGY | Facility: CLINIC | Age: 71
End: 2021-03-12
Payer: COMMERCIAL

## 2021-03-12 DIAGNOSIS — G24.3 CERVICAL DYSTONIA: Primary | ICD-10-CM

## 2021-03-12 DIAGNOSIS — G47.00 INSOMNIA, UNSPECIFIED TYPE: ICD-10-CM

## 2021-03-12 DIAGNOSIS — D17.79 LIPOMA OF SPINAL CORD: ICD-10-CM

## 2021-03-12 PROCEDURE — 99204 OFFICE O/P NEW MOD 45 MIN: CPT | Mod: 95 | Performed by: PSYCHIATRY & NEUROLOGY

## 2021-03-12 RX ORDER — TRIAMCINOLONE ACETONIDE 1 MG/G
1 CREAM TOPICAL 2 TIMES DAILY
COMMUNITY
Start: 2020-10-12

## 2021-03-12 NOTE — PROGRESS NOTES
Avelino is a 70 year old who is being evaluated via a billable video visit.      How would you like to obtain your AVS? Mychart  If the video visit is dropped, the invitation should be resent by: [phone]      Video Start Time:   Video-Visit Details    Type of service:  Video Visit    Video End Time:    Originating Location (pt. Location): Home    Distant Location (provider location):  [facility]     Platform used for Video Visit: [employer]

## 2021-03-12 NOTE — PATIENT INSTRUCTIONS
We will schedule you for another round of botulinum toxin injections.  First we'll be sure to get approval from your insurance and then we'll contact you to schedule this.    We also ordered a sleep psychology referral for your insomnia and would like you to get reconnected with neurosurgery to follow-up on your lipoma.

## 2021-03-19 ENCOUNTER — TELEPHONE (OUTPATIENT)
Dept: NEUROSURGERY | Facility: CLINIC | Age: 71
End: 2021-03-19

## 2021-03-19 DIAGNOSIS — D17.79 LIPOMA OF SPINAL CORD: Primary | ICD-10-CM

## 2021-03-19 NOTE — TELEPHONE ENCOUNTER
I spoke to the patient he is scheduled for a 1 year follow up on 04/01/21. MRI needed the day of appt message sent to Dr. Boswell nurse coordinator for orders. Patient aware that I would be calling back one the MRI is scheduled.

## 2021-03-24 ENCOUNTER — TELEPHONE (OUTPATIENT)
Dept: NEUROLOGY | Facility: CLINIC | Age: 71
End: 2021-03-24

## 2021-03-24 NOTE — TELEPHONE ENCOUNTER
[facility] Call Center    Phone Message    May a detailed message be left on voicemail: yes     Reason for Call: Other: Patient calling in regards to botox appointment that is scheduled on 4/22 with Dr. Duval - patient is checking up on prior authorization status and if that went through.     Please advise and call patient back at your earliest convenience      Action Taken: Other: [facility]    Travel Screening: Not Applicable

## 2021-03-25 NOTE — TELEPHONE ENCOUNTER
I informed Avelino his insurance covers his Botox up to 300 units per visit (every 3 months). If more is needed beyond 300 units we will submit a prior authorization. I reviewed that Dr. Duval will evaluate him prior to getting the injections as well as evaluating the injections he has gotten in the past. Each person is different in terms of what and how there muscles are affected by dystonia. I encouraged him to give it a few visits so the injection plan can be tailored to his needs. He reported Dr. Alexander wanted him to follow up with Dr. Boswell and get an MRI for his lipoma on his spine. He reports he has no symptoms however and asks if he needs to get the MRI given this. The MRI appears to have been ordered by Dr. Boswell. I reviewed that often they like to have imaging done every so often to look for growth or changes.     Plan/recommendation:  1. Botox injections with Dr. Duval on 4/22.  2. I will send a [employer] message with the Botox savings program information.  3. I will reach out to Dr. Boswell team to update them and ask them to contact you with a plan going forward.    13 minute phone call

## 2021-04-16 ASSESSMENT — ENCOUNTER SYMPTOMS
TROUBLE SWALLOWING: 0
DYSURIA: 0
HOARSE VOICE: 0
SLEEP DISTURBANCES DUE TO BREATHING: 0
HYPOTENSION: 0
ORTHOPNEA: 0
NIGHT SWEATS: 0
SNORES LOUDLY: 0
JOINT SWELLING: 0
POSTURAL DYSPNEA: 0
LIGHT-HEADEDNESS: 0
MUSCLE CRAMPS: 0
SYNCOPE: 0
ALTERED TEMPERATURE REGULATION: 0
DIFFICULTY URINATING: 0
TASTE DISTURBANCE: 0
EYE REDNESS: 0
EYE IRRITATION: 0
NECK MASS: 0
HEMATURIA: 0
WHEEZING: 1
SORE THROAT: 0
DYSPNEA ON EXERTION: 0
DOUBLE VISION: 0
LEG PAIN: 0
MYALGIAS: 1
FEVER: 0
MUSCLE WEAKNESS: 0
SPUTUM PRODUCTION: 0
SINUS CONGESTION: 0
EYE PAIN: 0
EXERCISE INTOLERANCE: 0
DECREASED APPETITE: 0
PALPITATIONS: 0
SINUS PAIN: 0
NECK PAIN: 1
POLYPHAGIA: 0
HEMOPTYSIS: 0
INCREASED ENERGY: 0
POLYDIPSIA: 0
FLANK PAIN: 0
CHILLS: 0
STIFFNESS: 0
WEIGHT GAIN: 0
EYE WATERING: 1
FATIGUE: 0
SMELL DISTURBANCE: 0
SHORTNESS OF BREATH: 0
COUGH: 0
ARTHRALGIAS: 0
HYPERTENSION: 1
COUGH DISTURBING SLEEP: 0
HALLUCINATIONS: 0
BACK PAIN: 0
WEIGHT LOSS: 0

## 2021-04-21 NOTE — PROGRESS NOTES
Movement Disorders Botulinum Toxin Clinic Note    Chief Complaint: cervical dystonia    History of Present Illness:  Markel Baron is a 70 year old male who presents to clinic for botulinum toxin injections for treatment of cervical dystonia.    He was doing exercises which he felt was helping but now is having trouble turning to the right.  At the time of his last injections he wasn't sure they were effective but now his symptoms are worse.    Previously he has had head extension weakness.    Current Outpatient Medications   Medication Sig Dispense Refill     albuterol (PROAIR HFA) 108 (90 Base) MCG/ACT Inhaler as needed        budesonide-formoterol (SYMBICORT) 80-4.5 MCG/ACT Inhaler Inhale 2 puffs into the lungs daily       Cholecalciferol (VITAMIN D3) 1000 units CAPS 2,000 Units daily        Omega-3 Fatty Acids (FISH OIL) 1200 MG capsule        simvastatin (ZOCOR) 40 MG tablet 40 mg At Bedtime        triamcinolone (KENALOG) 0.1 % external cream Apply 1 Application topically 2 times daily         Allergies: He is allergic to cats; dust mites; mold; and ragweeds.    Past Medical History:   Diagnosis Date     Asthma      Atopic dermatitis      Cervical dystonia      Lipoma of spinal cord      Uncomplicated asthma        Past Surgical History:   Procedure Laterality Date     COLONOSCOPY  5 years ago     HERNIA REPAIR  17 years ago     ORTHOPEDIC SURGERY  shoulder 37 years ago, knee cartilage 30 years ago       Social History     Socioeconomic History     Marital status:      Spouse name: Not on file     Number of children: Not on file     Years of education: Not on file     Highest education level: Not on file   Occupational History     Not on file   Social Needs     Financial resource strain: Not on file     Food insecurity     Worry: Not on file     Inability: Not on file     Transportation needs     Medical: Not on file     Non-medical: Not on file   Tobacco Use     Smoking status: Former Smoker      Packs/day: 1.50     Years: 10.00     Pack years: 15.00     Types: Cigarettes, Dip, chew, snus or snuff     Quit date: 1985     Years since quittin.3     Smokeless tobacco: Former User     Quit date: 1985   Substance and Sexual Activity     Alcohol use: Yes     Comment: rare     Drug use: Not Currently     Sexual activity: Never   Lifestyle     Physical activity     Days per week: Not on file     Minutes per session: Not on file     Stress: Not on file   Relationships     Social connections     Talks on phone: Not on file     Gets together: Not on file     Attends Protestant service: Not on file     Active member of club or organization: Not on file     Attends meetings of clubs or organizations: Not on file     Relationship status: Not on file     Intimate partner violence     Fear of current or ex partner: Not on file     Emotionally abused: Not on file     Physically abused: Not on file     Forced sexual activity: Not on file   Other Topics Concern     Parent/sibling w/ CABG, MI or angioplasty before 65F 55M? Yes     Comment: mother was 65 when she  of heart attack   Social History Narrative     Not on file       No family history on file.    Physical Examination:  Vital Signs:   blood pressure is 162/91 (abnormal) and his pulse is 74. His respiration is 16 and oxygen saturation is 97%.   Left tortocollis, right laterocollis, left shoulder elevation    BOTULINUM NEUROTOXIN INJECTION PROCEDURES:    VERIFICATION OF PATIENT IDENTIFICATION AND PROCEDURE     Initials   Patient Name [facility]   Patient  [facility]   Procedure Verified by: [facility]     Above assessments performed by:  Resident/Fellow         Nita Arreaga MD          The attending provider was present for the entire procedure documented below.      Martine Duval MD      INDICATION/S FOR PROCEDURE/S:  Markel Baron is a 70 year old year old patient with dystonia affecting the  head, neck and shoulder girdle musculature secondary to a diagnosis  of cervical dystonia with associated  pain, spasms and loss of volitional motor control.     His baseline symptoms have been recalcitrant to oral medications and conservative therapy.  He is here today for an injection of Botox.      GOAL OF PROCEDURE:  The goal of this procedure is to improve volitional motor control and decrease pain  associated with dystonic movements.    TOTAL DOSE ADMINISTERED:  Dose Administered: 285 units Botox    Diluent Used:  Preservative Free Normal Saline  Total Volume of Diluent Used:  3 ml  Lot # [id number] with Expiration Date:  12/2023  Lot # [id number] with Expiration Date: 7/2023  NDC #: Botox 100u ([address]-06), Botox 200u (9482-7252-44)    CONSENT:  The risks, benefits, and treatment options were discussed with Markel Baron and he agreed to proceed.      Written consent was obtained by acc.     EQUIPMENT USED:  Needle-37mm stimulating/recording  EMG/NCS Machine    SKIN PREPARATION:  Skin preparation was performed using an alcohol wipe.    GUIDANCE DESCRIPTION:  Electro-myographic guidance was necessary throughout the procedure to accurately identify all areas of dystonic muscles while avoiding injection of non-dystonic muscles, neighboring nerves and nearby vascular structures.     AREA/MUSCLE INJECTED:      Visual display of locations injections are scanned into the chart under MEDIA tab.    Muscles Injected Units Injected Number of Injections   L longissimus capitis 40 2   L splenius capitis 50 2   L levator scapulae 20 1   L trapezius 40 2   R SCM 55 2   R trapezius 45 3   R splenius 35 2        Total Units Injected: 285    Unavoidable Waste: 15    Total Units Billed 300      The patient tolerated the injections without difficulty.      Assessment:    Markel Baron is a 70 year old male with cervical dystonia.  Today we did repeat botulinum toxin injections.    Plan  Follow-up in 3 months' time to consider repeat injections.    Neurology Attending Attestation:     Martine WEEMS  MD Simin, personally saw this patient with our Movement Disorders Fellow and agree with the fellow's findings and plan of care as documented in the movement disorder fellow's note. I personally performed salient aspects of the history and neurological examination.     I personally reviewed the vital signs, medications, and labs. I personally viewed the imaging, and agree with the interpretation documented by the fellow.    I personally performed or supervised all procedures.    Martine Duval MD    of Neurology       Answers for HPI/ROS submitted by the patient on 4/16/2021   General Symptoms: Yes  Skin Symptoms: No  HENT Symptoms: Yes  EYE SYMPTOMS: Yes  HEART SYMPTOMS: Yes  LUNG SYMPTOMS: Yes  INTESTINAL SYMPTOMS: No  URINARY SYMPTOMS: Yes  REPRODUCTIVE SYMPTOMS: No  SKELETAL SYMPTOMS: Yes  BLOOD SYMPTOMS: No  NERVOUS SYSTEM SYMPTOMS: No  MENTAL HEALTH SYMPTOMS: No  Fever: No  Loss of appetite: No  Weight loss: No  Weight gain: No  Fatigue: No  Night sweats: No  Chills: No  Increased stress: Yes  Excessive hunger: No  Excessive thirst: No  Feeling hot or cold when others believe the temperature is normal: No  Loss of height: No  Post-operative complications: No  Surgical site pain: No  Hallucinations: No  Change in or Loss of Energy: No  Hyperactivity: No  Confusion: No  Ear pain: Yes  Ear discharge: No  Hearing loss: No  Tinnitus: No  Nosebleeds: No  Congestion: No  Sinus pain: No  Trouble swallowing: No   Voice hoarseness: No  Mouth sores: No  Sore throat: No  Tooth pain: No  Gum tenderness: No  Bleeding gums: Yes  Change in taste: No  Change in sense of smell: No  Dry mouth: No  Hearing aid used: Yes  Neck lump: No  Eye pain: No  Vision loss: No  Dry eyes: No  Watery eyes: Yes  Eye bulging: No  Double vision: No  Flashing of lights: No  Spots: No  Floaters: No  Redness: No  Crossed eyes: No  Tunnel Vision: No  Yellowing of eyes: No  Eye irritation: No  Cough: No  Sputum or phlegm:  No  Coughing up blood: No  Difficulty breating or shortness of breath: No  Snoring: No  Wheezing: Yes  Difficulty breathing on exertion: No  Nighttime Cough: No  Difficulty breathing when lying flat: No  Chest pain or pressure: No  Fast or irregular heartbeat: No  Pain in legs with walking: No  Trouble breathing while lying down: No  Fingers or toes appear blue: No  High blood pressure: Yes  Low blood pressure: No  Fainting: No  Murmurs: No  Pacemaker: No  Varicose veins: No  Edema or swelling: No  Wake up at night with shortness of breath: No  Light-headedness: No  Exercise intolerance: No  Trouble holding urine or incontinence: No  Pain or burning: No  Trouble starting or stopping: No  Increased frequency of urination: No  Blood in urine: No  Decreased frequency of urination: No  Frequent nighttime urination: Yes  Flank pain: No  Difficulty emptying bladder: No  Back pain: No  Muscle aches: Yes  Neck pain: Yes  Swollen joints: No  Joint pain: No  Bone pain: No  Muscle cramps: No  Muscle weakness: No  Joint stiffness: No  Bone fracture: No

## 2021-04-22 ENCOUNTER — OFFICE VISIT (OUTPATIENT)
Dept: NEUROLOGY | Facility: CLINIC | Age: 71
End: 2021-04-22
Payer: COMMERCIAL

## 2021-04-22 VITALS
HEART RATE: 74 BPM | SYSTOLIC BLOOD PRESSURE: 162 MMHG | RESPIRATION RATE: 16 BRPM | DIASTOLIC BLOOD PRESSURE: 91 MMHG | OXYGEN SATURATION: 97 %

## 2021-04-22 DIAGNOSIS — G24.3 CERVICAL DYSTONIA: Primary | ICD-10-CM

## 2021-04-22 PROCEDURE — 64616 CHEMODENERV MUSC NECK DYSTON: CPT | Mod: 50 | Performed by: PSYCHIATRY & NEUROLOGY

## 2021-04-22 PROCEDURE — 95874 GUIDE NERV DESTR NEEDLE EMG: CPT | Performed by: PSYCHIATRY & NEUROLOGY

## 2021-04-22 ASSESSMENT — PAIN SCALES - GENERAL: PAINLEVEL: MILD PAIN (3)

## 2021-04-22 NOTE — LETTER
4/22/2021       RE: Markel Baron  [address] [facility] [address]     Dear Colleague,    Thank you for referring your patient, Markel Baron, to the [facility] NEUROLOGY CLINIC [place] at [facility]. Please see a copy of my visit note below.    Movement Disorders Botulinum Toxin Clinic Note    Chief Complaint: cervical dystonia    History of Present Illness:  Markel Baron is a 70 year old male who presents to clinic for botulinum toxin injections for treatment of cervical dystonia.    He was doing exercises which he felt was helping but now is having trouble turning to the right.  At the time of his last injections he wasn't sure they were effective but now his symptoms are worse.    Previously he has had head extension weakness.    Current Outpatient Medications   Medication Sig Dispense Refill     albuterol (PROAIR HFA) 108 (90 Base) MCG/ACT Inhaler as needed        budesonide-formoterol (SYMBICORT) 80-4.5 MCG/ACT Inhaler Inhale 2 puffs into the lungs daily       Cholecalciferol (VITAMIN D3) 1000 units CAPS 2,000 Units daily        Omega-3 Fatty Acids (FISH OIL) 1200 MG capsule        simvastatin (ZOCOR) 40 MG tablet 40 mg At Bedtime        triamcinolone (KENALOG) 0.1 % external cream Apply 1 Application topically 2 times daily         Allergies: He is allergic to cats; dust mites; mold; and ragweeds.    Past Medical History:   Diagnosis Date     Asthma      Atopic dermatitis      Cervical dystonia      Lipoma of spinal cord      Uncomplicated asthma        Past Surgical History:   Procedure Laterality Date     COLONOSCOPY  5 years ago     HERNIA REPAIR  17 years ago     ORTHOPEDIC SURGERY  shoulder 37 years ago, knee cartilage 30 years ago       Social History     Socioeconomic History     Marital status:      Spouse name: Not on file     Number of children: Not on file     Years of education: Not on file     Highest education level:  Not on file   Occupational History     Not on file   Social Needs     Financial resource strain: Not on file     Food insecurity     Worry: Not on file     Inability: Not on file     Transportation needs     Medical: Not on file     Non-medical: Not on file   Tobacco Use     Smoking status: Former Smoker     Packs/day: 1.50     Years: 10.00     Pack years: 15.00     Types: Cigarettes, Dip, chew, snus or snuff     Quit date: 1985     Years since quittin.3     Smokeless tobacco: Former User     Quit date: 1985   Substance and Sexual Activity     Alcohol use: Yes     Comment: rare     Drug use: Not Currently     Sexual activity: Never   Lifestyle     Physical activity     Days per week: Not on file     Minutes per session: Not on file     Stress: Not on file   Relationships     Social connections     Talks on phone: Not on file     Gets together: Not on file     Attends Sabianist service: Not on file     Active member of club or organization: Not on file     Attends meetings of clubs or organizations: Not on file     Relationship status: Not on file     Intimate partner violence     Fear of current or ex partner: Not on file     Emotionally abused: Not on file     Physically abused: Not on file     Forced sexual activity: Not on file   Other Topics Concern     Parent/sibling w/ CABG, MI or angioplasty before 65F 55M? Yes     Comment: mother was 65 when she  of heart attack   Social History Narrative     Not on file       No family history on file.    Physical Examination:  Vital Signs:   blood pressure is 162/91 (abnormal) and his pulse is 74. His respiration is 16 and oxygen saturation is 97%.   Left tortocollis, right laterocollis, left shoulder elevation    BOTULINUM NEUROTOXIN INJECTION PROCEDURES:    VERIFICATION OF PATIENT IDENTIFICATION AND PROCEDURE     Initials   Patient Name acc   Patient  acc   Procedure Verified by: [facility]     Above assessments performed by:  Resident/Fellow         Nita  SUSU Arreaga MD          The attending provider was present for the entire procedure documented below.      Martine Duval MD      INDICATION/S FOR PROCEDURE/S:  Markel Baron is a 70 year old year old patient with dystonia affecting the  head, neck and shoulder girdle musculature secondary to a diagnosis of cervical dystonia with associated  pain, spasms and loss of volitional motor control.     His baseline symptoms have been recalcitrant to oral medications and conservative therapy.  He is here today for an injection of Botox.      GOAL OF PROCEDURE:  The goal of this procedure is to improve volitional motor control and decrease pain  associated with dystonic movements.    TOTAL DOSE ADMINISTERED:  Dose Administered: 285 units Botox    Diluent Used:  Preservative Free Normal Saline  Total Volume of Diluent Used:  3 ml  Lot # [id number] with Expiration Date:  12/2023  Lot # [id number] with Expiration Date: 7/2023  NDC #: Botox 100u ([address]-59), Botox 200u (0679-3348-31)    CONSENT:  The risks, benefits, and treatment options were discussed with Markel Baron and he agreed to proceed.      Written consent was obtained by acc.     EQUIPMENT USED:  Needle-37mm stimulating/recording  EMG/NCS Machine    SKIN PREPARATION:  Skin preparation was performed using an alcohol wipe.    GUIDANCE DESCRIPTION:  Electro-myographic guidance was necessary throughout the procedure to accurately identify all areas of dystonic muscles while avoiding injection of non-dystonic muscles, neighboring nerves and nearby vascular structures.     AREA/MUSCLE INJECTED:      Visual display of locations injections are scanned into the chart under MEDIA tab.    Muscles Injected Units Injected Number of Injections   L longissimus capitis 40 2   L splenius capitis 50 2   L levator scapulae 20 1   L trapezius 40 2   R SCM 55 2   R trapezius 45 3   R splenius 35 2        Total Units Injected: 285    Unavoidable Waste: 15    Total Units Billed 300       The patient tolerated the injections without difficulty.      Assessment:    Markel Baron is a 70 year old male with cervical dystonia.  Today we did repeat botulinum toxin injections.    Plan  Follow-up in 3 months' time to consider repeat injections.    Neurology Attending Attestation:     I, Martine Duval MD, personally saw this patient with our Movement Disorders Fellow and agree with the fellow's findings and plan of care as documented in the movement disorder fellow's note. I personally performed salient aspects of the history and neurological examination.     I personally reviewed the vital signs, medications, and labs. I personally viewed the imaging, and agree with the interpretation documented by the fellow.    I personally performed or supervised all procedures.    Martine Duval MD    of Neurology

## 2021-05-09 ENCOUNTER — HEALTH MAINTENANCE LETTER (OUTPATIENT)
Age: 71
End: 2021-05-09

## 2021-07-22 ENCOUNTER — OFFICE VISIT (OUTPATIENT)
Dept: NEUROLOGY | Facility: CLINIC | Age: 71
End: 2021-07-22
Payer: COMMERCIAL

## 2021-07-22 VITALS
SYSTOLIC BLOOD PRESSURE: 165 MMHG | RESPIRATION RATE: 16 BRPM | HEART RATE: 69 BPM | OXYGEN SATURATION: 98 % | DIASTOLIC BLOOD PRESSURE: 80 MMHG

## 2021-07-22 DIAGNOSIS — G24.3 CERVICAL DYSTONIA: Primary | ICD-10-CM

## 2021-07-22 PROCEDURE — 64616 CHEMODENERV MUSC NECK DYSTON: CPT | Mod: 50 | Performed by: PSYCHIATRY & NEUROLOGY

## 2021-07-22 PROCEDURE — 95874 GUIDE NERV DESTR NEEDLE EMG: CPT | Performed by: PSYCHIATRY & NEUROLOGY

## 2021-07-22 ASSESSMENT — PAIN SCALES - GENERAL: PAINLEVEL: NO PAIN (0)

## 2021-07-22 NOTE — PROGRESS NOTES
"Movement Disorders Botulinum Toxin Clinic Note    Chief Complaint: cervical dystonia    History of Present Illness:  Markel Baron is a 70 year old male who presents to clinic for botulinum toxin injections for treatment of cervical dystonia.    His last injections were his first in 3 years.     Response to Last Injection:      Onset of effect:  Started to notice effect after 1 week.     Benefit from last injections:  Significantly less pulling to the left. He estimates 60-70 improvement. He still had some remaining difficulty holding his head turned to the right.     Wearing off: He noticed wearing off starting a couple of weeks ago when he noticed it was a little bit more difficult to turn his head to the right.     Side effects: He reports weakness of holding up the head starting at 2 weeks, and lasting up to 1 month.     Additional interval history: Has been doing \"brain retraining exercises\" for cervical dystonia recommended online by [facility] (in [place])      Current Outpatient Medications   Medication Sig Dispense Refill     albuterol (PROAIR HFA) 108 (90 Base) MCG/ACT Inhaler as needed        budesonide-formoterol (SYMBICORT) 80-4.5 MCG/ACT Inhaler Inhale 2 puffs into the lungs daily       Cholecalciferol (VITAMIN D3) 1000 units CAPS 2,000 Units daily        Omega-3 Fatty Acids (FISH OIL) 1200 MG capsule        simvastatin (ZOCOR) 40 MG tablet 40 mg At Bedtime        triamcinolone (KENALOG) 0.1 % external cream Apply 1 Application topically 2 times daily         Allergies: He is allergic to cats, dust mites, mold, and ragweeds.    Past Medical History:   Diagnosis Date     Asthma      Atopic dermatitis      Cervical dystonia      Lipoma of spinal cord      Uncomplicated asthma        Past Surgical History:   Procedure Laterality Date     COLONOSCOPY  5 years ago     HERNIA REPAIR  17 years ago     ORTHOPEDIC SURGERY  shoulder 37 years ago, knee cartilage 30 years ago       Social History "     Socioeconomic History     Marital status:      Spouse name: Not on file     Number of children: Not on file     Years of education: Not on file     Highest education level: Not on file   Occupational History     Not on file   Tobacco Use     Smoking status: Former Smoker     Packs/day: 1.50     Years: 10.00     Pack years: 15.00     Types: Cigarettes, Dip, chew, snus or snuff     Quit date: 1985     Years since quittin.5     Smokeless tobacco: Former User     Quit date: 1985   Substance and Sexual Activity     Alcohol use: Yes     Comment: rare     Drug use: Not Currently     Sexual activity: Never   Other Topics Concern     Parent/sibling w/ CABG, MI or angioplasty before 65F 55M? Yes     Comment: mother was 65 when she  of heart attack   Social History Narrative     Not on file     Social Determinants of Health     Financial Resource Strain:      Difficulty of Paying Living Expenses:    Food Insecurity:      Worried About Running Out of Food in the Last Year:      Ran Out of Food in the Last Year:    Transportation Needs:      Lack of Transportation (Medical):      Lack of Transportation (Non-Medical):    Physical Activity:      Days of Exercise per Week:      Minutes of Exercise per Session:    Stress:      Feeling of Stress :    Social Connections:      Frequency of Communication with Friends and Family:      Frequency of Social Gatherings with Friends and Family:      Attends Advent Services:      Active Member of Clubs or Organizations:      Attends Club or Organization Meetings:      Marital Status:    Intimate Partner Violence:      Fear of Current or Ex-Partner:      Emotionally Abused:      Physically Abused:      Sexually Abused:        No family history on file.    Physical Examination:  Vital Signs:   blood pressure is 165/80 (abnormal) and his pulse is 69. His respiration is 16 and oxygen saturation is 98%.   He is alert and oriented and has fluent speech without  dysarthria and is able to provide an interval medical history  Left torticollis to 40-45 degrees. Requires sensory trick to turn head to right. Mild retrocollis.     BOTULINUM NEUROTOXIN INJECTION PROCEDURES:    VERIFICATION OF PATIENT IDENTIFICATION AND PROCEDURE     Initials   Patient Name LES   Patient  LES   Procedure Verified by: LES     Above assessments performed by:      Martine Duval MD      INDICATION/S FOR PROCEDURE/S:  Markel Baron is a 70 year old year old patient with dystonia affecting the  head, neck and shoulder girdle musculature secondary to a diagnosis of cervical dystonia with associated  pain, spasms and loss of joint motion.     His baseline symptoms have been recalcitrant to oral medications and conservative therapy.  He is here today for an injection of Botox.      GOAL OF PROCEDURE:  The goal of this procedure is to increase active range of motion, improve volitional motor control and decrease pain  associated with dystonic movements.    TOTAL DOSE ADMINISTERED:  Dose Administered:  250 units Botox    Diluent Used:  Preservative Free Normal Saline  Total Volume of Diluent Used:  3 ml (100 unit(s)/1mL)  Lot # [id number] with Expiration Date: 10/31/23  NDC #: Botox 100u ([address]-02)    CONSENT:  The risks, benefits, and treatment options were discussed with Markel Baron and she agreed to proceed.      Written consent was obtained by LES.     EQUIPMENT USED:  Needle-37mm stimulating/recording    SKIN PREPARATION:  Skin preparation was performed using an alcohol wipe.    GUIDANCE DESCRIPTION:  Electro-myographic guidance was necessary throughout the procedure to accurately identify all areas of dystonic muscles while avoiding injection of non-dystonic muscles, neighboring nerves and nearby vascular structures.     AREA/MUSCLE INJECTED:      Visual display of locations injections are scanned into the chart under MEDIA tab.       Muscles Injected Units Injected Number of Injections    L longissimus capitis 35 (4) 2   L splenius capitis 25 (50) 2   L levator scapulae 20 (20) 1   L trapezius 55 (40) 4   R SCM 40 (55) 2   R trapezius 45 (45) 3   R splenius 30 (35) 2           Total Units Injected: 250     Unavoidable Waste: 50     Total Units Billed 300            The patient tolerated the injections without difficulty.      Assessment:    Markel Baron is a 70 year old male with cervical dystonia with good response to last set of injections.  Today we did repeat botulinum toxin injections.    Plan  Follow-up in 3 months' time to consider repeat injections      Martine Duval MD    of Neurology           Answers for HPI/ROS submitted by the patient on 7/21/2021  General Symptoms: No  Skin Symptoms: No  HENT Symptoms: No  EYE SYMPTOMS: No  HEART SYMPTOMS: No  LUNG SYMPTOMS: No  INTESTINAL SYMPTOMS: No  URINARY SYMPTOMS: No  REPRODUCTIVE SYMPTOMS: No  SKELETAL SYMPTOMS: No  BLOOD SYMPTOMS: No  NERVOUS SYSTEM SYMPTOMS: No  MENTAL HEALTH SYMPTOMS: No

## 2021-07-22 NOTE — LETTER
"7/22/2021       RE: Markel Baron  [address] [facility] [address]     Dear Colleague,    Thank you for referring your patient, Markel Baron, to the [facility] NEUROLOGY CLINIC [place] at [facility]. Please see a copy of my visit note below.    Movement Disorders Botulinum Toxin Clinic Note    Chief Complaint: cervical dystonia    History of Present Illness:  Markel Baron is a 70 year old male who presents to clinic for botulinum toxin injections for treatment of cervical dystonia.    His last injections were his first in 3 years.     Response to Last Injection:      Onset of effect:  Started to notice effect after 1 week.     Benefit from last injections:  Significantly less pulling to the left. He estimates 60-70 improvement. He still had some remaining difficulty holding his head turned to the right.     Wearing off: He noticed wearing off starting a couple of weeks ago when he noticed it was a little bit more difficult to turn his head to the right.     Side effects: He reports weakness of holding up the head starting at 2 weeks, and lasting up to 1 month.     Additional interval history: Has been doing \"brain retraining exercises\" for cervical dystonia recommended online by [facility] (in [place])      Current Outpatient Medications   Medication Sig Dispense Refill     albuterol (PROAIR HFA) 108 (90 Base) MCG/ACT Inhaler as needed        budesonide-formoterol (SYMBICORT) 80-4.5 MCG/ACT Inhaler Inhale 2 puffs into the lungs daily       Cholecalciferol (VITAMIN D3) 1000 units CAPS 2,000 Units daily        Omega-3 Fatty Acids (FISH OIL) 1200 MG capsule        simvastatin (ZOCOR) 40 MG tablet 40 mg At Bedtime        triamcinolone (KENALOG) 0.1 % external cream Apply 1 Application topically 2 times daily         Allergies: He is allergic to cats, dust mites, mold, and ragweeds.    Past Medical History:   Diagnosis Date     Asthma      " Atopic dermatitis      Cervical dystonia      Lipoma of spinal cord      Uncomplicated asthma        Past Surgical History:   Procedure Laterality Date     COLONOSCOPY  5 years ago     HERNIA REPAIR  17 years ago     ORTHOPEDIC SURGERY  shoulder 37 years ago, knee cartilage 30 years ago       Social History     Socioeconomic History     Marital status:      Spouse name: Not on file     Number of children: Not on file     Years of education: Not on file     Highest education level: Not on file   Occupational History     Not on file   Tobacco Use     Smoking status: Former Smoker     Packs/day: 1.50     Years: 10.00     Pack years: 15.00     Types: Cigarettes, Dip, chew, snus or snuff     Quit date: 1985     Years since quittin.5     Smokeless tobacco: Former User     Quit date: 1985   Substance and Sexual Activity     Alcohol use: Yes     Comment: rare     Drug use: Not Currently     Sexual activity: Never   Other Topics Concern     Parent/sibling w/ CABG, MI or angioplasty before 65F 55M? Yes     Comment: mother was 65 when she  of heart attack   Social History Narrative     Not on file     Social Determinants of Health     Financial Resource Strain:      Difficulty of Paying Living Expenses:    Food Insecurity:      Worried About Running Out of Food in the Last Year:      Ran Out of Food in the Last Year:    Transportation Needs:      Lack of Transportation (Medical):      Lack of Transportation (Non-Medical):    Physical Activity:      Days of Exercise per Week:      Minutes of Exercise per Session:    Stress:      Feeling of Stress :    Social Connections:      Frequency of Communication with Friends and Family:      Frequency of Social Gatherings with Friends and Family:      Attends Hinduism Services:      Active Member of Clubs or Organizations:      Attends Club or Organization Meetings:      Marital Status:    Intimate Partner Violence:      Fear of Current or Ex-Partner:       Emotionally Abused:      Physically Abused:      Sexually Abused:        No family history on file.    Physical Examination:  Vital Signs:   blood pressure is 165/80 (abnormal) and his pulse is 69. His respiration is 16 and oxygen saturation is 98%.   He is alert and oriented and has fluent speech without dysarthria and is able to provide an interval medical history  Left torticollis to 40-45 degrees. Requires sensory trick to turn head to right. Mild retrocollis.     BOTULINUM NEUROTOXIN INJECTION PROCEDURES:    VERIFICATION OF PATIENT IDENTIFICATION AND PROCEDURE     Initials   Patient Name LES   Patient  LES   Procedure Verified by: LES     Above assessments performed by:      Martine Duval MD      INDICATION/S FOR PROCEDURE/S:  Markel Baron is a 70 year old year old patient with dystonia affecting the  head, neck and shoulder girdle musculature secondary to a diagnosis of cervical dystonia with associated  pain, spasms and loss of joint motion.     His baseline symptoms have been recalcitrant to oral medications and conservative therapy.  He is here today for an injection of Botox.      GOAL OF PROCEDURE:  The goal of this procedure is to increase active range of motion, improve volitional motor control and decrease pain  associated with dystonic movements.    TOTAL DOSE ADMINISTERED:  Dose Administered:  250 units Botox    Diluent Used:  Preservative Free Normal Saline  Total Volume of Diluent Used:  3 ml (100 unit(s)/1mL)  Lot # [id number] with Expiration Date: 10/31/23  NDC #: Botox 100u ([address]-19)    CONSENT:  The risks, benefits, and treatment options were discussed with Markel Baron and she agreed to proceed.      Written consent was obtained by LES.     EQUIPMENT USED:  Needle-37mm stimulating/recording    SKIN PREPARATION:  Skin preparation was performed using an alcohol wipe.    GUIDANCE DESCRIPTION:  Electro-myographic guidance was necessary throughout the procedure to accurately identify  all areas of dystonic muscles while avoiding injection of non-dystonic muscles, neighboring nerves and nearby vascular structures.     AREA/MUSCLE INJECTED:      Visual display of locations injections are scanned into the chart under MEDIA tab.       Muscles Injected Units Injected Number of Injections   L longissimus capitis 35 (4) 2   L splenius capitis 25 (50) 2   L levator scapulae 20 (20) 1   L trapezius 55 (40) 4   R SCM 40 (55) 2   R trapezius 45 (45) 3   R splenius 30 (35) 2           Total Units Injected: 250     Unavoidable Waste: 50     Total Units Billed 300            The patient tolerated the injections without difficulty.      Assessment:    Markel Baron is a 70 year old male with cervical dystonia with good response to last set of injections.  Today we did repeat botulinum toxin injections.    Plan  Follow-up in 3 months' time to consider repeat injections      Martine Duval MD    of Neurology           Answers for HPI/ROS submitted by the patient on 7/21/2021  General Symptoms: No  Skin Symptoms: No  HENT Symptoms: No  EYE SYMPTOMS: No  HEART SYMPTOMS: No  LUNG SYMPTOMS: No  INTESTINAL SYMPTOMS: No  URINARY SYMPTOMS: No  REPRODUCTIVE SYMPTOMS: No  SKELETAL SYMPTOMS: No  BLOOD SYMPTOMS: No  NERVOUS SYSTEM SYMPTOMS: No  MENTAL HEALTH SYMPTOMS: No      Again, thank you for allowing me to participate in the care of your patient.      Sincerely,    Martine Duval MD

## 2021-10-24 ENCOUNTER — HEALTH MAINTENANCE LETTER (OUTPATIENT)
Age: 71
End: 2021-10-24

## 2022-06-05 ENCOUNTER — HEALTH MAINTENANCE LETTER (OUTPATIENT)
Age: 72
End: 2022-06-05

## 2022-10-15 ENCOUNTER — HEALTH MAINTENANCE LETTER (OUTPATIENT)
Age: 72
End: 2022-10-15

## 2023-06-11 ENCOUNTER — HEALTH MAINTENANCE LETTER (OUTPATIENT)
Age: 73
End: 2023-06-11

## 2024-08-04 ENCOUNTER — HEALTH MAINTENANCE LETTER (OUTPATIENT)
Age: 74
End: 2024-08-04